# Patient Record
Sex: MALE | Race: WHITE | Employment: FULL TIME | URBAN - METROPOLITAN AREA
[De-identification: names, ages, dates, MRNs, and addresses within clinical notes are randomized per-mention and may not be internally consistent; named-entity substitution may affect disease eponyms.]

---

## 2022-10-06 ENCOUNTER — HOSPITAL ENCOUNTER (INPATIENT)
Age: 37
LOS: 4 days | Discharge: HOME | DRG: 751 | End: 2022-10-10
Attending: EMERGENCY MEDICINE | Admitting: INTERNAL MEDICINE
Payer: COMMERCIAL

## 2022-10-06 DIAGNOSIS — F32.A DEPRESSION WITH SUICIDAL IDEATION: Primary | ICD-10-CM

## 2022-10-06 DIAGNOSIS — R45.851 DEPRESSION WITH SUICIDAL IDEATION: Primary | ICD-10-CM

## 2022-10-06 LAB
ABSOLUTE EOS #: 0 K/UL (ref 0–0.4)
ABSOLUTE LYMPH #: 1.8 K/UL (ref 1–4.8)
ABSOLUTE MONO #: 0.5 K/UL (ref 0.1–1.3)
ACETAMINOPHEN LEVEL: <5 UG/ML (ref 10–30)
ALBUMIN SERPL-MCNC: 4.7 G/DL (ref 3.5–5.2)
ALP BLD-CCNC: 59 U/L (ref 40–129)
ALT SERPL-CCNC: 15 U/L (ref 5–41)
AMPHETAMINE SCREEN URINE: NEGATIVE
ANION GAP SERPL CALCULATED.3IONS-SCNC: 13 MMOL/L (ref 9–17)
AST SERPL-CCNC: 17 U/L
BARBITURATE SCREEN URINE: NEGATIVE
BASOPHILS # BLD: 0 % (ref 0–2)
BASOPHILS ABSOLUTE: 0 K/UL (ref 0–0.2)
BENZODIAZEPINE SCREEN, URINE: NEGATIVE
BILIRUB SERPL-MCNC: 0.7 MG/DL (ref 0.3–1.2)
BUN BLDV-MCNC: 9 MG/DL (ref 6–20)
CALCIUM SERPL-MCNC: 9.6 MG/DL (ref 8.6–10.4)
CANNABINOID SCREEN URINE: POSITIVE
CHLORIDE BLD-SCNC: 102 MMOL/L (ref 98–107)
CO2: 24 MMOL/L (ref 20–31)
COCAINE METABOLITE, URINE: NEGATIVE
CREAT SERPL-MCNC: 0.93 MG/DL (ref 0.7–1.2)
EOSINOPHILS RELATIVE PERCENT: 0 % (ref 0–4)
ETHANOL PERCENT: <0.01 %
ETHANOL: <10 MG/DL
FENTANYL URINE: NEGATIVE
GFR SERPL CREATININE-BSD FRML MDRD: >60 ML/MIN/1.73M2
GLUCOSE BLD-MCNC: 106 MG/DL (ref 70–99)
HCT VFR BLD CALC: 43.6 % (ref 41–53)
HEMOGLOBIN: 14.8 G/DL (ref 13.5–17.5)
LYMPHOCYTES # BLD: 17 % (ref 24–44)
MAGNESIUM: 2 MG/DL (ref 1.6–2.6)
MCH RBC QN AUTO: 28.8 PG (ref 26–34)
MCHC RBC AUTO-ENTMCNC: 33.8 G/DL (ref 31–37)
MCV RBC AUTO: 85 FL (ref 80–100)
METHADONE SCREEN, URINE: NEGATIVE
MONOCYTES # BLD: 5 % (ref 1–7)
OPIATES, URINE: NEGATIVE
OXYCODONE SCREEN URINE: NEGATIVE
PDW BLD-RTO: 13.2 % (ref 11.5–14.9)
PHENCYCLIDINE, URINE: NEGATIVE
PLATELET # BLD: 344 K/UL (ref 150–450)
PMV BLD AUTO: 7.7 FL (ref 6–12)
POTASSIUM SERPL-SCNC: 4.2 MMOL/L (ref 3.7–5.3)
RBC # BLD: 5.13 M/UL (ref 4.5–5.9)
SALICYLATE LEVEL: <1 MG/DL (ref 3–10)
SEG NEUTROPHILS: 78 % (ref 36–66)
SEGMENTED NEUTROPHILS ABSOLUTE COUNT: 8.3 K/UL (ref 1.3–9.1)
SODIUM BLD-SCNC: 139 MMOL/L (ref 135–144)
TEST INFORMATION: ABNORMAL
TOTAL PROTEIN: 7.7 G/DL (ref 6.4–8.3)
WBC # BLD: 10.5 K/UL (ref 3.5–11)

## 2022-10-06 PROCEDURE — 80143 DRUG ASSAY ACETAMINOPHEN: CPT

## 2022-10-06 PROCEDURE — 84443 ASSAY THYROID STIM HORMONE: CPT

## 2022-10-06 PROCEDURE — 83735 ASSAY OF MAGNESIUM: CPT

## 2022-10-06 PROCEDURE — 99285 EMERGENCY DEPT VISIT HI MDM: CPT

## 2022-10-06 PROCEDURE — 36415 COLL VENOUS BLD VENIPUNCTURE: CPT

## 2022-10-06 PROCEDURE — 80053 COMPREHEN METABOLIC PANEL: CPT

## 2022-10-06 PROCEDURE — 80307 DRUG TEST PRSMV CHEM ANLYZR: CPT

## 2022-10-06 PROCEDURE — 1240000000 HC EMOTIONAL WELLNESS R&B

## 2022-10-06 PROCEDURE — 6370000000 HC RX 637 (ALT 250 FOR IP): Performed by: PSYCHIATRY & NEUROLOGY

## 2022-10-06 PROCEDURE — G0480 DRUG TEST DEF 1-7 CLASSES: HCPCS

## 2022-10-06 PROCEDURE — 80179 DRUG ASSAY SALICYLATE: CPT

## 2022-10-06 PROCEDURE — 85025 COMPLETE CBC W/AUTO DIFF WBC: CPT

## 2022-10-06 RX ORDER — NAPROXEN SODIUM 220 MG
220 TABLET ORAL 2 TIMES DAILY PRN
COMMUNITY

## 2022-10-06 RX ORDER — HYDROXYZINE 50 MG/1
50 TABLET, FILM COATED ORAL 3 TIMES DAILY PRN
Status: DISCONTINUED | OUTPATIENT
Start: 2022-10-06 | End: 2022-10-10 | Stop reason: HOSPADM

## 2022-10-06 RX ORDER — HALOPERIDOL 5 MG
5 TABLET ORAL EVERY 6 HOURS PRN
Status: DISCONTINUED | OUTPATIENT
Start: 2022-10-06 | End: 2022-10-10 | Stop reason: HOSPADM

## 2022-10-06 RX ORDER — ACETAMINOPHEN 325 MG/1
650 TABLET ORAL EVERY 6 HOURS PRN
Status: DISCONTINUED | OUTPATIENT
Start: 2022-10-06 | End: 2022-10-08

## 2022-10-06 RX ORDER — HALOPERIDOL 5 MG/ML
5 INJECTION INTRAMUSCULAR EVERY 6 HOURS PRN
Status: DISCONTINUED | OUTPATIENT
Start: 2022-10-06 | End: 2022-10-10 | Stop reason: HOSPADM

## 2022-10-06 RX ORDER — TRAZODONE HYDROCHLORIDE 50 MG/1
50 TABLET ORAL NIGHTLY PRN
Status: DISCONTINUED | OUTPATIENT
Start: 2022-10-06 | End: 2022-10-10 | Stop reason: HOSPADM

## 2022-10-06 RX ORDER — IBUPROFEN 400 MG/1
400 TABLET ORAL EVERY 6 HOURS PRN
Status: DISCONTINUED | OUTPATIENT
Start: 2022-10-06 | End: 2022-10-10 | Stop reason: HOSPADM

## 2022-10-06 RX ORDER — DIPHENHYDRAMINE HYDROCHLORIDE 50 MG/ML
50 INJECTION INTRAMUSCULAR; INTRAVENOUS EVERY 6 HOURS PRN
Status: DISCONTINUED | OUTPATIENT
Start: 2022-10-06 | End: 2022-10-10 | Stop reason: HOSPADM

## 2022-10-06 RX ORDER — MAGNESIUM HYDROXIDE/ALUMINUM HYDROXICE/SIMETHICONE 120; 1200; 1200 MG/30ML; MG/30ML; MG/30ML
30 SUSPENSION ORAL EVERY 6 HOURS PRN
Status: DISCONTINUED | OUTPATIENT
Start: 2022-10-06 | End: 2022-10-10 | Stop reason: HOSPADM

## 2022-10-06 RX ORDER — POLYETHYLENE GLYCOL 3350 17 G/17G
17 POWDER, FOR SOLUTION ORAL DAILY PRN
Status: DISCONTINUED | OUTPATIENT
Start: 2022-10-06 | End: 2022-10-10 | Stop reason: HOSPADM

## 2022-10-06 RX ADMIN — HYDROXYZINE HYDROCHLORIDE 50 MG: 50 TABLET ORAL at 21:53

## 2022-10-06 RX ADMIN — TRAZODONE HYDROCHLORIDE 50 MG: 50 TABLET ORAL at 21:53

## 2022-10-06 ASSESSMENT — SLEEP AND FATIGUE QUESTIONNAIRES
SLEEP PATTERN: DIFFICULTY FALLING ASLEEP
AVERAGE NUMBER OF SLEEP HOURS: 6
DO YOU HAVE DIFFICULTY SLEEPING: YES
DO YOU USE A SLEEP AID: NO

## 2022-10-06 ASSESSMENT — PATIENT HEALTH QUESTIONNAIRE - PHQ9
SUM OF ALL RESPONSES TO PHQ QUESTIONS 1-9: 15
SUM OF ALL RESPONSES TO PHQ QUESTIONS 1-9: 17

## 2022-10-06 ASSESSMENT — ENCOUNTER SYMPTOMS
ABDOMINAL PAIN: 0
BACK PAIN: 0
EYE PAIN: 0
SHORTNESS OF BREATH: 0
COLOR CHANGE: 0

## 2022-10-06 ASSESSMENT — LIFESTYLE VARIABLES
HOW MANY STANDARD DRINKS CONTAINING ALCOHOL DO YOU HAVE ON A TYPICAL DAY: PATIENT DOES NOT DRINK
HOW OFTEN DO YOU HAVE A DRINK CONTAINING ALCOHOL: NEVER

## 2022-10-06 ASSESSMENT — PAIN - FUNCTIONAL ASSESSMENT: PAIN_FUNCTIONAL_ASSESSMENT: NONE - DENIES PAIN

## 2022-10-06 NOTE — ED NOTES
Patient pleasant in conversation, quietly resting in recliner. Blood draws obtained, awaiting urine specimen. Belongings and patient checked by security. Belongings locked up. Pt in blue gown.       Debby Ramey LPN  80/27/86 [id number]

## 2022-10-06 NOTE — ED PROVIDER NOTES
EMERGENCY DEPARTMENT ENCOUNTER    Pt Name: Redd Meade  MRN: [id number]  Birthdate 1985  Date of evaluation: 10/6/22  CHIEF COMPLAINT       Chief Complaint   Patient presents with    Suicidal     HISTORY OF PRESENT ILLNESS   78-year-old male presents for mental health evaluation. Patient reports that recently he has been feeling increasingly depressed secondary to family issues as well as relationship issues. He reports that last day or so he has been feeling suicidal.  He reports that he was having a plan to try and crash his car or shoot himself he reports that he does have guns in the house and access to guns. He denies any homicidal ideation, denies any visual or auditory hallucinations, denies any recent alcohol or drug use. Patient denies any medical complaints at this time. The history is provided by the patient. REVIEW OF SYSTEMS     Review of Systems   Constitutional:  Negative for chills and fever. HENT:  Negative for congestion and ear pain. Eyes:  Negative for pain. Respiratory:  Negative for shortness of breath. Cardiovascular:  Negative for chest pain, palpitations and leg swelling. Gastrointestinal:  Negative for abdominal pain. Genitourinary:  Negative for dysuria and flank pain. Musculoskeletal:  Negative for back pain. Skin:  Negative for color change. Neurological:  Negative for numbness and headaches. Psychiatric/Behavioral:  Positive for suicidal ideas. Negative for confusion. All other systems reviewed and are negative. PASTMEDICAL HISTORY     Past Medical History:   Diagnosis Date    Depression with suicidal ideation 10/6/2022     Past Problem List  Patient Active Problem List   Diagnosis Code    Depression with suicidal ideation F32. A, R45.851     SURGICAL HISTORY     No past surgical history on file.   CURRENT MEDICATIONS       Current Discharge Medication List        CONTINUE these medications which have NOT CHANGED    Details   naproxen sodium (ALEVE) 220 MG tablet Take 220 mg by mouth 2 times daily as needed for Pain           ALLERGIES     has No Known Allergies. FAMILY HISTORY     has no family status information on file. SOCIAL HISTORY       Social History     Tobacco Use    Smoking status: Every Day     Packs/day: 0.50     Types: Cigarettes   Substance Use Topics    Alcohol use: Yes     Alcohol/week: 40.0 standard drinks     Types: 20 Cans of beer, 20 Shots of liquor per week    Drug use: Yes     Types: Marijuana (Weed)     Comment: last use 3 days ago     PHYSICAL EXAM     INITIAL VITALS: /69   Pulse (!) 120   Temp 98.3 °F (36.8 °C) (Oral)   Resp 14   Ht 5' 10\" (1.778 m)   Wt 138 lb (62.6 kg)   SpO2 99%   BMI 19.80 kg/m²    Physical Exam  Vitals and nursing note reviewed. Constitutional:       General: He is not in acute distress. Appearance: Normal appearance. He is not ill-appearing. HENT:      Head: Normocephalic and atraumatic. Right Ear: External ear normal.      Left Ear: External ear normal.      Nose: Nose normal.      Mouth/Throat:      Mouth: Mucous membranes are moist.   Eyes:      Extraocular Movements: Extraocular movements intact. Pupils: Pupils are equal, round, and reactive to light. Cardiovascular:      Rate and Rhythm: Normal rate and regular rhythm. Pulses: Normal pulses. Heart sounds: Normal heart sounds. Pulmonary:      Effort: Pulmonary effort is normal.      Breath sounds: Normal breath sounds. Abdominal:      General: Abdomen is flat. Palpations: Abdomen is soft. Tenderness: There is no abdominal tenderness. Musculoskeletal:         General: No tenderness. Normal range of motion. Cervical back: Neck supple. No spinous process tenderness or muscular tenderness. Skin:     General: Skin is warm and dry. Capillary Refill: Capillary refill takes less than 2 seconds. Neurological:      General: No focal deficit present.       Mental Status: He is alert and oriented to person, place, and time. Cranial Nerves: Cranial nerves 2-12 are intact. Sensory: Sensation is intact. Motor: Motor function is intact. Psychiatric:         Mood and Affect: Mood is depressed. Affect is tearful. Behavior: Behavior normal.         Thought Content: Thought content includes suicidal ideation. Thought content does not include homicidal ideation. Thought content includes suicidal plan. MEDICAL DECISION MAKIN-year-old male presents for mental health evaluation. On initial exam patient in no acute distress vitals stable, patient voicing active suicidal thoughts with plan, will check labs and discuss with psychiatry    Labs reviewed and unremarkable, given patient with active suicidal thoughts and plan feel he will benefit from admission, results were discussed with patient, discussed plan for admission he is agreeable    Spoke with Dr. Dimas Esparza who accepts admission. Patient demonstrates understanding and agreement with the plan, was given the opportunity to ask questions, and these questions were answered to the best of the provided information at this time. VS stable for transfer. This dictation was prepared using [email or web address] voice recognition software. CRITICAL CARE:       PROCEDURES:    Procedures    DIAGNOSTIC RESULTS   EKG:All EKG's are interpreted by the Emergency Department Physician who either signs or Co-signs this chart in the absence of a cardiologist.        RADIOLOGY:All plain film, CT, MRI, and formal ultrasound images (except ED bedside ultrasound) are read by the radiologist, see reports below, unless otherwisenoted in MDM or here. No orders to display     LABS: All lab results were reviewed by myself, and all abnormals are listed below.   Labs Reviewed   CBC WITH AUTO DIFFERENTIAL - Abnormal; Notable for the following components:       Result Value    Seg Neutrophils 78 (*)     Lymphocytes 17 (*)     All other components within normal limits   COMPREHENSIVE METABOLIC PANEL - Abnormal; Notable for the following components:    Glucose 106 (*)     All other components within normal limits   SALICYLATE LEVEL - Abnormal; Notable for the following components:    Salicylate Lvl <1 (*)     All other components within normal limits   ACETAMINOPHEN LEVEL - Abnormal; Notable for the following components:    Acetaminophen Level <5 (*)     All other components within normal limits   URINE DRUG SCREEN - Abnormal; Notable for the following components:    Cannabinoid Scrn, Ur POSITIVE (*)     All other components within normal limits   ETHANOL   MAGNESIUM       EMERGENCY DEPARTMENTCOURSE:         Vitals:    Vitals:    10/06/22 1442 10/06/22 1806 10/06/22 2002   BP: (!) 142/65 124/78 113/69   Pulse: 77 (!) 112 (!) 120   Resp: 18 20 14   Temp: 97.8 °F (36.6 °C) 98.1 °F (36.7 °C) 98.3 °F (36.8 °C)   TempSrc: Oral Oral Oral   SpO2: 99% 99%    Weight: 135 lb (61.2 kg) 138 lb (62.6 kg)    Height: 5' 10\" (1.778 m) 5' 10\" (1.778 m)        The patient was given the following medications while in the emergency department:  Orders Placed This Encounter   Medications    acetaminophen (TYLENOL) tablet 650 mg    ibuprofen (ADVIL;MOTRIN) tablet 400 mg    hydrOXYzine HCl (ATARAX) tablet 50 mg    traZODone (DESYREL) tablet 50 mg    polyethylene glycol (GLYCOLAX) packet 17 g    aluminum & magnesium hydroxide-simethicone (MAALOX) 200-200-20 MG/5ML suspension 30 mL    nicotine polacrilex (NICORETTE) gum 2 mg    AND Linked Order Group     haloperidol lactate (HALDOL) injection 5 mg     diphenhydrAMINE (BENADRYL) injection 50 mg    haloperidol (HALDOL) tablet 5 mg     CONSULTS:  IP CONSULT TO INTERNAL MEDICINE    FINAL IMPRESSION      1. Depression with suicidal ideation          DISPOSITION/PLAN   DISPOSITION Admitted 10/06/2022 04:38:34 PM      PATIENT REFERRED TO:  No follow-up provider specified.   DISCHARGE MEDICATIONS:  Current Discharge Medication List The care is provided during an unprecedented national emergency due to the novel coronavirus, COVID 19.   [address], DO                     [address], DO  10/07/22 9092

## 2022-10-06 NOTE — BH NOTE
Patient given tobacco quitline number [phone] at this time, refusing to call at this time, states \" I just dont want to quit now\"- patient given information as to the dangers of long term tobacco use. Continue to reinforce the importance of tobacco cessation.

## 2022-10-06 NOTE — BH NOTE
[facility]  Admission Note     Admission Type:   Admission Type: Voluntary    Reason for admission:  Reason for Admission: Suicidal ideations due to family complications - Wife wanting to seperate      Addictive Behavior:   Addictive Behavior  In the Past 3 Months, Have You Felt or Has Someone Told You That You Have a Problem With  : None    Medical Problems:   Past Medical History:   Diagnosis Date    Depression with suicidal ideation 10/6/2022       Status EXAM:  Mental Status and Behavioral Exam  Normal: No  Level of Assistance: Independent/Self  Facial Expression: Sad, Worried  Affect: Appropriate  Level of Consciousness: Alert  Frequency of Checks: 4 times per hour, close  Mood:Normal: No  Mood: Depressed, Anxious, Sad, Guilty, Helpless  Motor Activity:Normal: No  Motor Activity: Decreased  Eye Contact: Good  Observed Behavior: Cooperative, Friendly, Tearful  Sexual Misconduct History: Current - no  Preception: [place] to person, [place] to time, [place] to place, [place] to situation  Attention:Normal: Yes  Thought Processes: Other (comment) (Linear)  Thought Content:Normal: No  Thought Content: Preoccupations  Depression Symptoms: Feelings of hopelessess, Feelings of worthlessness, Feelings of helplessness, Crying, Change in energy level  Anxiety Symptoms: Generalized  Katt Symptoms: No problems reported or observed.   Hallucinations: None  Delusions: No  Memory:Normal: Yes  Insight and Judgment: No  Insight and Judgment: Poor judgment, Poor insight    Tobacco Screening:  Practical Counseling, on admission, nikky X, if applicable and completed (first 3 are required if patient doesn't refuse):            ( ) Recognizing danger situations (included triggers and roadblocks)                    ( ) Coping skills (new ways to manage stress,relaxation techniques, changing routine, distraction)                                                           ( ) Basic information about quitting (benefits of

## 2022-10-06 NOTE — ED NOTES
Safeguard in [facility] for patient watch. Safeguard informed that they need to stay with the patient at all time, must be present in the room and if they need a break or relief to let the nurse know so they can be replaced. Safeguard verbalizes understanding. Belongings and patient checked by security. Belongings locked up. Pt in blue gown.       Rebekah Nguyen RN  10/06/22 1063

## 2022-10-06 NOTE — PROGRESS NOTES
Medication History completed:    New medications: naproxen    Medications discontinued: ibuprofen    Changes to dosing: none    Stated allergies: NKDA    Other pertinent information: Medications confirmed with patient. He denies use of prescription medications. The patient reports use of cannabis yesterday.     Thank you,  Adrián Holman, PharmD, BCPS  [phone]

## 2022-10-06 NOTE — BH NOTE
Patient has arrived on the unit. Patient was scanned with metal detector and changed into a gown before oriented to unit.

## 2022-10-06 NOTE — ED TRIAGE NOTES
Mode of arrival (squad #, walk in, police, etc) : walk-in        Chief complaint(s): SI        Arrival Note (brief scenario, treatment PTA, etc). : Pt reports SI all day today. Pt reports he is afraid to be at home alone because he might shoot himself. C= \"Have you ever felt that you should Cut down on your drinking? \"  No  A= \"Have people Annoyed you by criticizing your drinking? \"  No  G= \"Have you ever felt bad or Guilty about your drinking? \"  No  E= \"Have you ever had a drink as an Eye-opener first thing in the morning to steady your nerves or to help a hangover? \"  No      Deferred []      Reason for deferring: N/A    *If yes to two or more: probable alcohol abuse. *

## 2022-10-07 PROBLEM — F33.2 MAJOR DEPRESSIVE DISORDER, RECURRENT SEVERE WITHOUT PSYCHOTIC FEATURES (HCC): Status: ACTIVE | Noted: 2022-10-07

## 2022-10-07 PROBLEM — F12.90 CANNABIS USE DISORDER: Status: ACTIVE | Noted: 2022-10-07

## 2022-10-07 LAB — TSH SERPL DL<=0.05 MIU/L-ACNC: 0.91 UIU/ML (ref 0.3–5)

## 2022-10-07 PROCEDURE — APPSS60 APP SPLIT SHARED TIME 46-60 MINUTES: Performed by: NURSE PRACTITIONER

## 2022-10-07 PROCEDURE — 99223 1ST HOSP IP/OBS HIGH 75: CPT | Performed by: INTERNAL MEDICINE

## 2022-10-07 PROCEDURE — 6370000000 HC RX 637 (ALT 250 FOR IP): Performed by: PSYCHIATRY & NEUROLOGY

## 2022-10-07 PROCEDURE — 1240000000 HC EMOTIONAL WELLNESS R&B

## 2022-10-07 PROCEDURE — 90792 PSYCH DIAG EVAL W/MED SRVCS: CPT | Performed by: PSYCHIATRY & NEUROLOGY

## 2022-10-07 RX ORDER — SERTRALINE HYDROCHLORIDE 25 MG/1
25 TABLET, FILM COATED ORAL DAILY
Status: DISCONTINUED | OUTPATIENT
Start: 2022-10-07 | End: 2022-10-08

## 2022-10-07 RX ADMIN — NICOTINE POLACRILEX 2 MG: 2 GUM, CHEWING BUCCAL at 15:00

## 2022-10-07 RX ADMIN — NICOTINE POLACRILEX 2 MG: 2 GUM, CHEWING BUCCAL at 09:11

## 2022-10-07 RX ADMIN — TRAZODONE HYDROCHLORIDE 50 MG: 50 TABLET ORAL at 21:47

## 2022-10-07 RX ADMIN — SERTRALINE HYDROCHLORIDE 25 MG: 25 TABLET ORAL at 16:05

## 2022-10-07 RX ADMIN — NICOTINE POLACRILEX 2 MG: 2 GUM, CHEWING BUCCAL at 18:47

## 2022-10-07 RX ADMIN — HYDROXYZINE HYDROCHLORIDE 50 MG: 50 TABLET ORAL at 21:47

## 2022-10-07 ASSESSMENT — PAIN SCALES - GENERAL: PAINLEVEL_OUTOF10: 0
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
## 2022-10-07 NOTE — GROUP NOTE
Group Therapy Note    Date: 10/7/2022    Group Start Time: 1300  Group End Time: 1430  Group Topic: Cognitive Skills    FITO Zavala        Group Therapy Note    Attendees: 6/19     Patient's Goal:  Be able to think critically while working in teams with peers. Notes:  Played board games in teams    Status After Intervention:  Improved    Participation Level:  Active Listener and Interactive    Participation Quality: Appropriate, Attentive, Sharing, and Supportive      Speech:  normal      Thought Process/Content: Logical  Linear      Affective Functioning: Congruent      Mood: euthymic      Level of consciousness:  Alert, Oriented x4, and Attentive      Response to Learning: Capable of insight      Endings: None Reported    Modes of Intervention: Support, Socialization, and Problem-solving      Discipline Responsible: Behavorial Health Tech      Signature:  Sadaf Zavala

## 2022-10-07 NOTE — GROUP NOTE
Group Therapy Note    Date: 10/7/2022    Group Start Time: 0900  Group End Time: 0930  Group Topic: Community Meeting    [facility] Group Note        Date: October 7, 2022 Start Time: 0900 End Time: 0930      Number of Participants in Group & Unit Census:  13/19    Topic: Goals Group    Goal of Group: State 1 or 2 short term goals the patient would like to accomplish by the end of the day today. Comments:    Patient did not participate in [employer] group, despite staff encouragement and explanation of benefits. Patient remain seclusive to self. Q15 minute safety checks maintained for patient safety and will continue to encourage patient to attend unit programming.        Group Therapy Note    Attendees: 13/19     Signature:  John Alex

## 2022-10-07 NOTE — PLAN OF CARE
[facility]  Initial Interdisciplinary Treatment Plan NO      Original treatment plan Date & Time: 10/7/2022 1300    Admission Type:  Admission Type: Voluntary    Reason for admission:   Reason for Admission: Suicidal ideations due to family complications - Wife wanting to seperate    Estimated Length of Stay:  5-7days  Estimated Discharge Date: to be determined by physician    PATIENT STRENGTHS:  Patient Strengths:   Patient Strengths and Limitations:   Addictive Behavior: Addictive Behavior  In the Past 3 Months, Have You Felt or Has Someone Told You That You Have a Problem With  : None  Medical Problems:  Past Medical History:   Diagnosis Date    Depression with suicidal ideation 10/6/2022     Status EXAM:Mental Status and Behavioral Exam  Normal: No  Level of Assistance: Independent/Self  Facial Expression: Sad, Worried  Affect: Appropriate  Level of Consciousness: Alert  Frequency of Checks: 4 times per hour, close  Mood:Normal: No  Mood: Depressed, Anxious, Sad, Helpless  Motor Activity:Normal: No  Motor Activity: Decreased  Eye Contact: Good  Observed Behavior: Cooperative, Preoccupied  Sexual Misconduct History: Current - no  Preception: [place] to person, [place] to time, [place] to place, [place] to situation  Attention:Normal: Yes  Thought Processes: Other (comment) (preoccupied concerning issues)  Thought Content:Normal: No  Thought Content: Preoccupations  Depression Symptoms: Feelings of helplessness, Feelings of hopelessess, Feelings of worthlessness  Anxiety Symptoms: Generalized  Katt Symptoms: No problems reported or observed.   Hallucinations: None  Delusions: No  Memory:Normal: Yes  Insight and Judgment: Yes  Insight and Judgment: Poor judgment, Poor insight    EDUCATION:   Learner Progress Toward Treatment Goals: reviewed group plans and strategies for care    Method:group therapy, medication compliance, individualized assessments and care planning    Outcome: needs reinforcement    PATIENT GOALS: Short term: Start medications and plan discharge for outpatient care. Long term: Medication compliance, going to therapy, and continuing treatment when patient leaves facility.      PLAN/TREATMENT RECOMMENDATIONS:     continue group therapy , medications compliance, goal setting, individualized assessments and care, continue to monitor pt on unit      SHORT-TERM GOALS:   Time frame for Short-Term Goals: 5-7 days    LONG-TERM GOALS:  Time frame for Long-Term Goals: 6 months  Members Present in Team Meeting: See Signature Sheet    Boyd Mendez RN

## 2022-10-07 NOTE — H&P
[facility] Internal Medicine  Estela Perry MD; Michael Ann MD; Rosalio Gibbons MD; MD Shannan Montero MD; MD KEITH [facility] Internal Medicine   [facility]    HISTORY AND PHYSICAL EXAMINATION            Date:   10/7/2022  Patient name:  Tunde Clark  Date of admission:  10/6/2022  2:46 PM  MRN:   [id number]  Account:  [de-identified]  YOB: 1985  PCP:    No primary care provider on file. Room:   [address] [place] [address]  Code Status:    Full Code    Chief Complaint:     Chief Complaint   Patient presents with    Suicidal       History Obtained From:     Patient medical record nursing staff    History of Present Illness:     Tunde Clark is a 40 y.o. Non- / non  male who presents with Suicidal   and is admitted to the hospital for the management of Depression with suicidal ideation. 60-year-old gentleman with a mild mild protein calorie malnutrition no history of hyper or hypothyroidism admitted with a depression suicidal ideation denies any nausea vomiting diarrhea  No aggravating relieving factor      Past Medical History:     Past Medical History:   Diagnosis Date    Depression with suicidal ideation 10/6/2022        Past Surgical History:     No past surgical history on file. Medications Prior to Admission:     Prior to Admission medications    Medication Sig Start Date End Date Taking? Authorizing Provider   naproxen sodium (ALEVE) 220 MG tablet Take 220 mg by mouth 2 times daily as needed for Pain   Yes Historical Provider, MD        Allergies:     Patient has no known allergies. Social History:     Tobacco:    reports that he has been smoking cigarettes. He has been smoking an average of .5 packs per day. He does not have any smokeless tobacco history on file. Alcohol:      reports current alcohol use of about 40.0 standard drinks per week. Drug Use:  reports current drug use. Drug: Marijuana Aloma Maiyet). Family History:     No family history on file. Review of Systems:     Positive and Negative as described in HPI. CONSTITUTIONAL:  negative for fevers, chills, sweats, fatigue, weight loss  HEENT:  negative for vision, hearing changes, runny nose, throat pain  RESPIRATORY:  negative for shortness of breath, cough, congestion, wheezing  CARDIOVASCULAR:  negative for chest pain, palpitations  GASTROINTESTINAL:  negative for nausea, vomiting, diarrhea, constipation, change in bowel habits, abdominal pain   GENITOURINARY:  negative for difficulty of urination, burning with urination, frequency   INTEGUMENT:  negative for rash, skin lesions, easy bruising   HEMATOLOGIC/LYMPHATIC:  negative for swelling/edema   ALLERGIC/IMMUNOLOGIC:  negative for urticaria , itching  ENDOCRINE:  negative increase in drinking, increase in urination, hot or cold intolerance  MUSCULOSKELETAL:  negative joint pains, muscle aches, swelling of joints  NEUROLOGICAL:  negative for headaches, dizziness, lightheadedness, numbness, pain, tingling extremities      Physical Exam:   BP (!) 103/58   Pulse (!) 42   Temp 98.3 °F (36.8 °C)   Resp 14   Ht 5' 10\" (1.778 m)   Wt 138 lb (62.6 kg)   SpO2 99%   BMI 19.80 kg/m²   Temp (24hrs), Av.1 °F (36.7 °C), Min:97.8 °F (36.6 °C), Max:98.3 °F (36.8 °C)    No results for input(s): POCGLU in the last 72 hours.   No intake or output data in the 24 hours ending 10/07/22 1047  Mild protein calorie malnutrition  General Appearance: alert, well appearing, and in no acute distress  Mental status: oriented to person, place, and time  Head: normocephalic, atraumatic  Eye: no icterus, redness, pupils equal and reactive, extraocular eye movements intact, conjunctiva clear  Ear: normal external ear, no discharge, hearing intact  Nose: no drainage noted  Mouth: mucous membranes moist  Neck: supple, no carotid bruits, thyroid not palpable  Lungs: Bilateral equal air entry, clear to ausculation, no wheezing, rales or rhonchi, normal effort  Cardiovascular: normal rate, regular rhythm, no murmur, gallop, rub  Abdomen: Soft, nontender, nondistended, normal bowel sounds, no hepatomegaly or splenomegaly  Neurologic: There are no new focal motor or sensory deficits, normal muscle tone and bulk, no abnormal sensation, normal speech, cranial nerves II through XII grossly intact  Skin: No gross lesions, rashes, bruising or bleeding on exposed skin area  Extremities: peripheral pulses palpable, no pedal edema or calf pain with palpation  Multiple tattoos noted    Investigations:      Laboratory Testing:  Recent Results (from the past 24 hour(s))   CBC with Auto Differential    Collection Time: 10/06/22  3:42 PM   Result Value Ref Range    WBC 10.5 3.5 - 11.0 k/uL    RBC 5.13 4.5 - 5.9 m/uL    Hemoglobin 14.8 13.5 - 17.5 g/dL    Hematocrit 43.6 41 - 53 %    MCV 85.0 80 - 100 fL    MCH 28.8 26 - 34 pg    MCHC 33.8 31 - 37 g/dL    RDW 13.2 11.5 - 14.9 %    Platelets 890 595 - 409 k/uL    MPV 7.7 6.0 - 12.0 fL    Seg Neutrophils 78 (H) 36 - 66 %    Lymphocytes 17 (L) 24 - 44 %    Monocytes 5 1 - 7 %    Eosinophils % 0 0 - 4 %    Basophils 0 0 - 2 %    Segs Absolute 8.30 1.3 - 9.1 k/uL    Absolute Lymph # 1.80 1.0 - 4.8 k/uL    Absolute Mono # 0.50 0.1 - 1.3 k/uL    Absolute Eos # 0.00 0.0 - 0.4 k/uL    Basophils Absolute 0.00 0.0 - 0.2 k/uL   CMP    Collection Time: 10/06/22  3:42 PM   Result Value Ref Range    Glucose 106 (H) 70 - 99 mg/dL    BUN 9 6 - 20 mg/dL    Creatinine 0.93 0.70 - 1.20 mg/dL    Est, Glom Filt Rate >60 >60 mL/min/1.73m2    Calcium 9.6 8.6 - 10.4 mg/dL    Sodium 139 135 - 144 mmol/L    Potassium 4.2 3.7 - 5.3 mmol/L    Chloride 102 98 - 107 mmol/L    CO2 24 20 - 31 mmol/L    Anion Gap 13 9 - 17 mmol/L    Alkaline Phosphatase 59 40 - 129 U/L    ALT 15 5 - 41 U/L    AST 17 <40 U/L    Total Bilirubin 0.7 0.3 - 1.2 mg/dL    Total Protein 7.7 6.4 - 8.3 g/dL    Albumin 4.7 3.5 - 5.2 g/dL Salicylate    Collection Time: 10/06/22  3:42 PM   Result Value Ref Range    Salicylate Lvl <1 (L) 3 - 10 mg/dL   Acetaminophen Level    Collection Time: 10/06/22  3:42 PM   Result Value Ref Range    Acetaminophen Level <5 (L) 10 - 30 ug/mL   ETOH    Collection Time: 10/06/22  3:42 PM   Result Value Ref Range    Ethanol <10 <10 mg/dL    Ethanol percent <0.010 %   Magnesium    Collection Time: 10/06/22  3:42 PM   Result Value Ref Range    Magnesium 2.0 1.6 - 2.6 mg/dL   Urine Drug Screen    Collection Time: 10/06/22  4:23 PM   Result Value Ref Range    Amphetamine Screen, Ur NEGATIVE NEGATIVE    Barbiturate Screen, Ur NEGATIVE NEGATIVE    Benzodiazepine Screen, Urine NEGATIVE     Cocaine Metabolite, Urine NEGATIVE NEGATIVE    Methadone Screen, Urine NEGATIVE NEGATIVE    Opiates, Urine NEGATIVE NEGATIVE    Phencyclidine, Urine NEGATIVE NEGATIVE    Cannabinoid Scrn, Ur POSITIVE (A) NEGATIVE    Oxycodone Screen, Ur NEGATIVE NEGATIVE    Fentanyl, Ur NEGATIVE NEGATIVE    Test Information       Assay provides medical screening only. The absence of expected drug(s) and/or metabolite(s) may indicate diluted or adulterated urine, limitations of testing or timing of collection. Imaging/Diagnostics:  No results found. Assessment :      Hospital Problems             Last Modified POA    * (Principal) Depression with suicidal ideation 10/6/2022 Yes       Plan:     81-year-old gentleman who has mental health disorder admitted for depression suicidal  No history of hyper or hypothyroidism  No significant weight loss recently  Mild protein calorie malnutrition  Street drug abuse positive for cannabis  Exams vitals labs reviewed we will follow  Check TSH with reflex        Eleazar House MD  10/7/2022  10:47 AM    Copy sent to Dr. Nicky Foster primary care provider on file. Please note that this chart was generated using voice recognition Dragon dictation software.   Although every effort was made to ensure the accuracy of this automated transcription, some errors in transcription may have occurred.

## 2022-10-07 NOTE — PLAN OF CARE
Problem: Self Harm/Suicidality  Goal: Will have no self-injury during hospital stay  Description: INTERVENTIONS:  1. Q 30 MINUTES: Routine safety checks  2. Q SHIFT & PRN: Assess risk to determine if routine checks are adequate to maintain patient safety  Outcome: Progressing  Note: Patient denies any thoughts to suicidal ideations and no signs of self harm were noted. Patient encouraged to inform staff if he starts to develop any thoughts to harm self. Patient voiced understanding. Problem: Depression  Goal: Will be euthymic at discharge  Description: INTERVENTIONS:  1. Administer medication as ordered  2. Provide emotional support via 1:1 interaction with staff  3. Encourage involvement in milieu/groups/activities  4. Monitor for social isolation  Outcome: Progressing  Note: Patient reports severe depression and anxiety related to his current life circumstances. Patient is tearful and crying during 1:1 interaction. Writer and pt discussed coping skills pt could use for depression and anxiety such as writing in a journal, goal writing, doing puzzles and talking to others. Pt denies any suicidal ideations at this time. Pt encouraged to inform staff if suicidal ideations return. Pt was accepting of education. Problem: Anxiety  Goal: Will report anxiety at manageable levels  Description: INTERVENTIONS:  1. Administer medication as ordered  2. Teach and rehearse alternative coping skills  3. Provide emotional support with 1:1 interaction with staff  Outcome: Abhijit Dubois (Taken 10/6/2022 2014)  Will report anxiety at manageable levels:   Administer medication as ordered   Provide emotional support with 1:1 interaction with staff   Teach and rehearse alternative coping skills  Note: Patient reports severe anxiety related to life circumstances. Pt reports he would like something for anxiety at bedtime to help him calm down enough to sleep.  Pt encouraged to inform staff if he needs any further interventions for anxiety. Pt voiced understanding.

## 2022-10-07 NOTE — PROGRESS NOTES
Behavioral Services  Medicare Certification Upon Admission    I certify that this patient's inpatient psychiatric hospital admission is medically necessary for:    [x] (1) Treatment which could reasonably be expected to improve this patient's condition,       [x] (2) Or for diagnostic study;     AND     [x](2) The inpatient psychiatric services are provided while the individual is under the care of a physician and are included in the individualized plan of care.     Estimated length of stay/service 4 to 7 days    Plan for post-hospital care Home with outpatient community mental health follow-up    Electronically signed by Ji Martin MD on 10/7/2022 at 3:30 PM

## 2022-10-07 NOTE — PROGRESS NOTES
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
## 2022-10-08 PROCEDURE — 6370000000 HC RX 637 (ALT 250 FOR IP): Performed by: PSYCHIATRY & NEUROLOGY

## 2022-10-08 PROCEDURE — 99232 SBSQ HOSP IP/OBS MODERATE 35: CPT | Performed by: INTERNAL MEDICINE

## 2022-10-08 PROCEDURE — 99232 SBSQ HOSP IP/OBS MODERATE 35: CPT | Performed by: NURSE PRACTITIONER

## 2022-10-08 PROCEDURE — 1240000000 HC EMOTIONAL WELLNESS R&B

## 2022-10-08 RX ORDER — ACETAMINOPHEN, ASPIRIN AND CAFFEINE 250; 250; 65 MG/1; MG/1; MG/1
1 TABLET, FILM COATED ORAL EVERY 6 HOURS PRN
Status: DISCONTINUED | OUTPATIENT
Start: 2022-10-08 | End: 2022-10-10 | Stop reason: HOSPADM

## 2022-10-08 RX ADMIN — NICOTINE POLACRILEX 2 MG: 2 GUM, CHEWING BUCCAL at 15:45

## 2022-10-08 RX ADMIN — NICOTINE POLACRILEX 2 MG: 2 GUM, CHEWING BUCCAL at 11:20

## 2022-10-08 RX ADMIN — TRAZODONE HYDROCHLORIDE 50 MG: 50 TABLET ORAL at 21:48

## 2022-10-08 RX ADMIN — SERTRALINE HYDROCHLORIDE 25 MG: 25 TABLET ORAL at 08:10

## 2022-10-08 RX ADMIN — ACETAMINOPHEN 650 MG: 325 TABLET, FILM COATED ORAL at 08:10

## 2022-10-08 RX ADMIN — HYDROXYZINE HYDROCHLORIDE 50 MG: 50 TABLET ORAL at 21:48

## 2022-10-08 RX ADMIN — IBUPROFEN 400 MG: 400 TABLET ORAL at 11:20

## 2022-10-08 RX ADMIN — NICOTINE POLACRILEX 2 MG: 2 GUM, CHEWING BUCCAL at 21:48

## 2022-10-08 ASSESSMENT — PAIN SCALES - GENERAL
PAINLEVEL_OUTOF10: 4
PAINLEVEL_OUTOF10: 4
PAINLEVEL_OUTOF10: 3
PAINLEVEL_OUTOF10: 6

## 2022-10-08 ASSESSMENT — PAIN DESCRIPTION - LOCATION
LOCATION: HEAD

## 2022-10-08 ASSESSMENT — PAIN DESCRIPTION - DESCRIPTORS: DESCRIPTORS: ACHING

## 2022-10-08 NOTE — PLAN OF CARE
Problem: Self Harm/Suicidality  Goal: Will have no self-injury during hospital stay  Description: INTERVENTIONS:  1. Q 30 MINUTES: Routine safety checks  2. Q SHIFT & PRN: Assess risk to determine if routine checks are adequate to maintain patient safety  Outcome: Progressing  Note: There have been no incidents of self harm observed or reported thus far, this shift. Problem: Depression  Goal: Will be euthymic at discharge  Description: INTERVENTIONS:  1. Administer medication as ordered  2. Provide emotional support via 1:1 interaction with staff  3. Encourage involvement in milieu/groups/activities  4. Monitor for social isolation  Outcome: Progressing  Note: Patient reports depression is improved. He is social with peers and out in day area for most of shift. Patient denies thoughts of harm to self or others. Visual safety checks are completed every 15 minutes and randomly. Patient reports that he is sleeping better and eating well. Problem: Anxiety  Goal: Will report anxiety at manageable levels  Description: INTERVENTIONS:  1. Administer medication as ordered  2. Teach and rehearse alternative coping skills  3.  Provide emotional support with 1:1 interaction with staff  Outcome: Progressing  Flowsheets  Taken 10/8/2022 1330  Will report anxiety at manageable levels:   Administer medication as ordered   Teach and rehearse alternative coping skills   Provide emotional support with 1:1 interaction with staff  Taken 10/8/2022 1325  Will report anxiety at manageable levels:   Administer medication as ordered   Teach and rehearse alternative coping skills   Provide emotional support with 1:1 interaction with staff     Problem: Pain  Goal: Verbalizes/displays adequate comfort level or baseline comfort level  Outcome: Progressing  Flowsheets (Taken 10/8/2022 1500)  Verbalizes/displays adequate comfort level or baseline comfort level:   Encourage patient to monitor pain and request assistance   Assess pain using appropriate pain scale   Administer analgesics based on type and severity of pain and evaluate response

## 2022-10-08 NOTE — PROGRESS NOTES
[facility]  Benjie Martinez MD; No Milan MD; Álvaro Summers MD; Clarke Capers, MD Brendolyn Rinne, MD; MD KEITH Aguero [facility]   [facility]    HISTORY AND PHYSICAL EXAMINATION            Date:   10/8/2022  Patient name:  Pamela Bowers  Date of admission:  10/6/2022  2:46 PM  MRN:   [id number]  Account:  [de-identified]  YOB: 1985  PCP:    No primary care provider on file. Room:   [address] [place] [address]  Code Status:    Full Code    Chief Complaint:     Chief Complaint   Patient presents with    Suicidal       History Obtained From:     Patient medical record nursing staff    History of Present Illness:     Pamela Bowers is a 40 y.o. Non- / non  male who presents with Suicidal   and is admitted to the hospital for the management of Major depressive disorder, recurrent severe without psychotic features ([facility] [address] 72-year-old gentleman with a mild mild protein calorie malnutrition no history of hyper or hypothyroidism admitted with a depression suicidal ideation denies any nausea vomiting diarrhea  No aggravating relieving factor      Past Medical History:     Past Medical History:   Diagnosis Date    Depression with suicidal ideation 10/6/2022        Past Surgical History:     No past surgical history on file. Medications Prior to Admission:     Prior to Admission medications    Medication Sig Start Date End Date Taking? Authorizing Provider   naproxen sodium (ALEVE) 220 MG tablet Take 220 mg by mouth 2 times daily as needed for Pain   Yes Historical Provider, MD        Allergies:     Patient has no known allergies. Social History:     Tobacco:    reports that he has been smoking cigarettes. He has been smoking an average of .5 packs per day. He does not have any smokeless tobacco history on file.   Alcohol:      reports current alcohol use of about 40.0 standard drinks per week.  Drug Use:  reports current drug use. Drug: Marijuana Sable Kavontammyjustina). Family History:     No family history on file. Review of Systems:     Positive and Negative as described in HPI. CONSTITUTIONAL:  negative for fevers, chills, sweats, fatigue, weight loss  HEENT:  negative for vision, hearing changes, runny nose, throat pain  RESPIRATORY:  negative for shortness of breath, cough, congestion, wheezing  CARDIOVASCULAR:  negative for chest pain, palpitations  GASTROINTESTINAL:  negative for nausea, vomiting, diarrhea, constipation, change in bowel habits, abdominal pain   GENITOURINARY:  negative for difficulty of urination, burning with urination, frequency   INTEGUMENT:  negative for rash, skin lesions, easy bruising   HEMATOLOGIC/LYMPHATIC:  negative for swelling/edema   ALLERGIC/IMMUNOLOGIC:  negative for urticaria , itching  ENDOCRINE:  negative increase in drinking, increase in urination, hot or cold intolerance  MUSCULOSKELETAL:  negative joint pains, muscle aches, swelling of joints  NEUROLOGICAL:  negative for headaches, dizziness, lightheadedness, numbness, pain, tingling extremities      Physical Exam:   BP (!) 101/46   Pulse 51   Temp 97.9 °F (36.6 °C) (Oral)   Resp 16   Ht 5' 10\" (1.778 m)   Wt 138 lb (62.6 kg)   SpO2 99%   BMI 19.80 kg/m²   Temp (24hrs), Av.1 °F (36.7 °C), Min:97.9 °F (36.6 °C), Max:98.3 °F (36.8 °C)    No results for input(s): POCGLU in the last 72 hours.   No intake or output data in the 24 hours ending 10/08/22 1359  Mild protein calorie malnutrition  General Appearance: alert, well appearing, and in no acute distress  Mental status: oriented to person, place, and time  Head: normocephalic, atraumatic  Eye: no icterus, redness, pupils equal and reactive, extraocular eye movements intact, conjunctiva clear  Ear: normal external ear, no discharge, hearing intact  Nose: no drainage noted  Mouth: mucous membranes moist  Neck: supple, no carotid bruits, thyroid not palpable  Lungs: Bilateral equal air entry, clear to ausculation, no wheezing, rales or rhonchi, normal effort  Cardiovascular: normal rate, regular rhythm, no murmur, gallop, rub  Abdomen: Soft, nontender, nondistended, normal bowel sounds, no hepatomegaly or splenomegaly  Neurologic: There are no new focal motor or sensory deficits, normal muscle tone and bulk, no abnormal sensation, normal speech, cranial nerves II through XII grossly intact  Skin: No gross lesions, rashes, bruising or bleeding on exposed skin area  Extremities: peripheral pulses palpable, no pedal edema or calf pain with palpation  Multiple tattoos noted    Investigations:      Laboratory Testing:  No results found for this or any previous visit (from the past 24 hour(s)). Imaging/Diagnostics:  No results found. Assessment :      Hospital Problems             Last Modified POA    * (Principal) Major depressive disorder, recurrent severe without psychotic features ([facility] [address] 10/7/2022 Yes    Cannabis use disorder 10/7/2022 Yes     Plan:     44-year-old gentleman who has mental health disorder admitted for depression suicidal  No history of hyper or hypothyroidism  No significant weight loss recently  Mild protein calorie malnutrition  Street drug abuse positive for cannabis  Exams vitals labs reviewed we will follow  = TSH with reflex 0.9   Will sign off        Michelle York MD  10/8/2022  1:59 PM    Copy sent to Dr. Bambi Daniels primary care provider on file. Please note that this chart was generated using voice recognition Dragon dictation software. Although every effort was made to ensure the accuracy of this automated transcription, some errors in transcription may have occurred.

## 2022-10-08 NOTE — PLAN OF CARE
Problem: Self Harm/Suicidality  Goal: Will have no self-injury during hospital stay  Description: INTERVENTIONS:  1. Q 30 MINUTES: Routine safety checks  2. Q SHIFT & PRN: Assess risk to determine if routine checks are adequate to maintain patient safety  10/7/2022 2056 by Maggi Sun RN  Outcome: Progressing  Note: Patient has had no physical injuries and no signs of self harm were noted. Patient encouraged to inform staff if he starts to develop any thoughts to harm self. Patient voiced understanding. Problem: Depression  Goal: Will be euthymic at discharge  Description: INTERVENTIONS:  1. Administer medication as ordered  2. Provide emotional support via 1:1 interaction with staff  3. Encourage involvement in milieu/groups/activities  4. Monitor for social isolation  10/7/2022 2056 by Maggi Sun RN  Outcome: Progressing  Note: Patient reports his depression and anxiety are \"a little better. \" Pt reports feeling less hopeless and helpless, also. Pt encouraged to attend groups to learn coping skills and patient showed understanding by attending groups. Problem: Anxiety  Goal: Will report anxiety at manageable levels  Description: INTERVENTIONS:  1. Administer medication as ordered  2. Teach and rehearse alternative coping skills  3. Provide emotional support with 1:1 interaction with staff  10/7/2022 2056 by Maggi Sun RN  Outcome: Progressing  Flowsheets (Taken 10/7/2022 2056)  Will report anxiety at manageable levels:   Administer medication as ordered   Provide emotional support with 1:1 interaction with staff   Teach and rehearse alternative coping skills  Note: Patient reports moderate anxiety related to his current life circumstances. Pt was given 1:1 talk time, distraction techniques of working on puzzles and socializing with peers. Pt requested atarax at bedtime to reduce anxiety.

## 2022-10-08 NOTE — GROUP NOTE
Group Therapy Note    Date: 10/7/2022    Group Start Time: 2030  Group End Time: 2100  Group Topic: Wrap-Up    [facility]        Group Therapy Note    Attendees: 9/20 for goal/ \"time capsule\" activity group       Patient's Goal:  buy land in [facility]    Notes:  good participation    Status After Intervention:  Improved    Participation Level: Interactive    Participation Quality: Appropriate      Speech:  normal      Thought Process/Content: Logical      Affective Functioning: Congruent      Mood: depressed      Level of consciousness:  Alert      Response to Learning: Able to verbalize current knowledge/experience      Endings: None Reported    Modes of Intervention: Support and Exploration      Discipline Responsible: Behavorial Health Tech      Signature:  Martita Amador shaking, generalized

## 2022-10-08 NOTE — PROGRESS NOTES
Daily Progress Note  10/8/2022    Patient Name: Zeinab Jorgensen    CHIEF COMPLAINT: Suicidal ideation with multiple plans and access         SUBJECTIVE:     Patient seen face-to-face for follow-up assessment. He is sitting in his room and is cooperative with discussion. Patient reports that he has spoken with his wife and has been more accepting of their current relationship status. He states that she has requested that he find another place to live when he is discharged from the hospital and he has taken that news fairly well. He does express that he had multiple episodes of crying spells and feels very low regarding the breakdown of his relationship, but is not feeling as hopeless as he was when he was first admitted to unit. Patient does express that he has support in his family and that his father will let him stay with him when he is discharged. Patient was started on sertraline 25 mg to help with his mood. We reviewed side effects and he denies all. Does express that he had headache this morning but verbalizes that he has a dependency on caffeine and has not had any caffeine in the last 36 hours. He utilized ibuprofen and acetaminophen with some benefit. Patient requesting Excedrin to help with headache. Discussed titrating his sertraline to 50 mg tomorrow morning and patient is in agreement. Patient not yet able to contract for safety off unit. Requires continued hospitalization for safety.      Psych med compliant: [x] Yes    [] No     E-meds in last 24 hrs: [] Yes   [x] No    Appetite:  [x] Normal/Adequate/Unchanged  [] Increased  [] Decreased      Sleep:       [] Normal/Adequate/Unchanged  [x] Fair  [] Poor      Group Attendance:   [x] Yes  [] Selectively    [] No    Medication Side Effects: Denies         Mental Status Exam  Level of consciousness: Alert and awake   Appearance: Appropriate attire for setting, seated on bed, with fair  grooming and hygiene   Behavior/Motor: Approachable, no psychomotor abnormalities   Attitude toward examiner: Cooperative, attentive, good eye contact   Speech: spontaneous, normal rate, normal volume, and well articulated   Mood:  patient reports \"down, but I'm trying to be hopeful\"  Affect: sad  Thought processes: linear and goal directed   Thought content: Denies homicidal ideation  Suicidal Ideation: reports improving suicidal ideation, unable to conrtact for safety off unit  Delusions: denies  Perceptual Disturbance: Denies. Does not appear to be responding to internal stimuli  Cognition: Oriented to self, location, time, and situation  Memory: intact  Insight & Judgement: fair     Data   height is 5' 10\" (1.778 m) and weight is 138 lb (62.6 kg). His oral temperature is 97.9 °F (36.6 °C). His blood pressure is 101/46 (abnormal) and his pulse is 51. His respiration is 16 and oxygen saturation is 99%.    Labs:   Admission on 10/06/2022   Component Date Value Ref Range Status    WBC 10/06/2022 10.5  3.5 - 11.0 k/uL Final    RBC 10/06/2022 5.13  4.5 - 5.9 m/uL Final    Hemoglobin 10/06/2022 14.8  13.5 - 17.5 g/dL Final    Hematocrit 10/06/2022 43.6  41 - 53 % Final    MCV 10/06/2022 85.0  80 - 100 fL Final    MCH 10/06/2022 28.8  26 - 34 pg Final    MCHC 10/06/2022 33.8  31 - 37 g/dL Final    RDW 10/06/2022 13.2  11.5 - 14.9 % Final    Platelets 03/99/5498 344  150 - 450 k/uL Final    MPV 10/06/2022 7.7  6.0 - 12.0 fL Final    Seg Neutrophils 10/06/2022 78 (A)  36 - 66 % Final    Lymphocytes 10/06/2022 17 (A)  24 - 44 % Final    Monocytes 10/06/2022 5  1 - 7 % Final    Eosinophils % 10/06/2022 0  0 - 4 % Final    Basophils 10/06/2022 0  0 - 2 % Final    Segs Absolute 10/06/2022 8.30  1.3 - 9.1 k/uL Final    Absolute Lymph # 10/06/2022 1.80  1.0 - 4.8 k/uL Final    Absolute Mono # 10/06/2022 0.50  0.1 - 1.3 k/uL Final    Absolute Eos # 10/06/2022 0.00  0.0 - 0.4 k/uL Final    Basophils Absolute 10/06/2022 0.00  0.0 - 0.2 k/uL Final    Glucose 10/06/2022 106 (A)  70 - 99 mg/dL Final    BUN 10/06/2022 9  6 - 20 mg/dL Final    Creatinine 10/06/2022 0.93  0.70 - 1.20 mg/dL Final    Est, Glom Filt Rate 10/06/2022 >60  >60 mL/min/1.73m2 Final    Comment:       Effective Oct 3, 2022        These results are not intended for use in patients <25years of age. eGFR results are calculated without a race factor using the 2021 CKD-EPI equation. Careful clinical correlation is recommended, particularly when comparing to results   calculated using previous equations. The CKD-EPI equation is less accurate in patients with extremes of muscle mass, extra-renal   metabolism of creatine, excessive creatine ingestion, or following therapy that affects   renal tubular secretion.       Calcium 10/06/2022 9.6  8.6 - 10.4 mg/dL Final    Sodium 10/06/2022 139  135 - 144 mmol/L Final    Potassium 10/06/2022 4.2  3.7 - 5.3 mmol/L Final    Chloride 10/06/2022 102  98 - 107 mmol/L Final    CO2 10/06/2022 24  20 - 31 mmol/L Final    Anion Gap 10/06/2022 13  9 - 17 mmol/L Final    Alkaline Phosphatase 10/06/2022 59  40 - 129 U/L Final    ALT 10/06/2022 15  5 - 41 U/L Final    AST 10/06/2022 17  <40 U/L Final    Total Bilirubin 10/06/2022 0.7  0.3 - 1.2 mg/dL Final    Total Protein 10/06/2022 7.7  6.4 - 8.3 g/dL Final    Albumin 10/06/2022 4.7  3.5 - 5.2 g/dL Final    Salicylate Lvl 26/15/7573 <1 (A)  3 - 10 mg/dL Final    Acetaminophen Level 10/06/2022 <5 (A)  10 - 30 ug/mL Final    Ethanol 10/06/2022 <10  <10 mg/dL Final    Ethanol percent 10/06/2022 <0.010  % Final    Magnesium 10/06/2022 2.0  1.6 - 2.6 mg/dL Final    Amphetamine Screen, Ur 10/06/2022 NEGATIVE  NEGATIVE Final    Comment:       (Positive cutoff 1000 ng/mL)                  Barbiturate Screen, Ur 10/06/2022 NEGATIVE  NEGATIVE Final    Comment:       (Positive cutoff 200 ng/mL)                  Benzodiazepine Screen, Urine 10/06/2022 NEGATIVE   Final    Comment:       (Positive cutoff 200 ng/mL)                  Cocaine Metabolite, Urine 10/06/2022 NEGATIVE  NEGATIVE Final    Comment:       (Positive cutoff 300 ng/mL)                  Methadone Screen, Urine 10/06/2022 NEGATIVE  NEGATIVE Final    Comment:       (Positive cutoff 300 ng/mL)                  Opiates, Urine 10/06/2022 NEGATIVE  NEGATIVE Final    Comment:       (Positive cutoff 300 ng/mL)                  Phencyclidine, Urine 10/06/2022 NEGATIVE  NEGATIVE Final    Comment:       (Positive cutoff 25 ng/mL)                  Cannabinoid Scrn, Ur 10/06/2022 POSITIVE (A)  NEGATIVE Final    Comment:       (Positive cutoff 50 ng/mL)                  Oxycodone Screen, Ur 10/06/2022 NEGATIVE  NEGATIVE Final    Comment:       (Positive cutoff 100 ng/mL)                  Fentanyl, Ur 10/06/2022 NEGATIVE  NEGATIVE Final    Comment:       (Positive cutoff  5 ng/ml)            Test Information 10/06/2022 Assay provides medical screening only. The absence of expected drug(s) and/or metabolite(s) may indicate diluted or adulterated urine, limitations of testing or timing of collection. Final    Comment: Testing for legal purposes should be confirmed by another method. To request confirmation   of test result, please call the lab within 7 days of sample submission. TSH 10/06/2022 0.91  0.30 - 5.00 uIU/mL Final         Reviewed patient's current plan of care and vital signs with nursing staff.     Labs reviewed: [x] Yes  Last EKG in EMR reviewed: [x] Yes    Medications  Current Facility-Administered Medications: sertraline (ZOLOFT) tablet 25 mg, 25 mg, Oral, Daily  acetaminophen (TYLENOL) tablet 650 mg, 650 mg, Oral, Q6H PRN  ibuprofen (ADVIL;MOTRIN) tablet 400 mg, 400 mg, Oral, Q6H PRN  hydrOXYzine HCl (ATARAX) tablet 50 mg, 50 mg, Oral, TID PRN  traZODone (DESYREL) tablet 50 mg, 50 mg, Oral, Nightly PRN  polyethylene glycol (GLYCOLAX) packet 17 g, 17 g, Oral, Daily PRN  aluminum & magnesium hydroxide-simethicone (MAALOX) 200-200-20 MG/5ML suspension 30 mL, 30 mL, Oral, Q6H PRN  nicotine polacrilex (NICORETTE) gum 2 mg, 2 mg, Oral, Q2H PRN  haloperidol lactate (HALDOL) injection 5 mg, 5 mg, IntraMUSCular, Q6H PRN **AND** diphenhydrAMINE (BENADRYL) injection 50 mg, 50 mg, IntraMUSCular, Q6H PRN  haloperidol (HALDOL) tablet 5 mg, 5 mg, Oral, Q6H PRN    ASSESSMENT  Major depressive disorder, recurrent severe without [facility] features [facility]         PLAN  Patient symptoms are: Modestly improving  Medication changes: Titrate sertraline 50 mg daily starting tomorrow a.m. Add Excedrin to regimen secondary to headache  Determine need for as needed emergency medications  Provide supportive psychotherapy  Encourage participation in groups and milieu. Probable discharge is next week  Follow-up daily while inpatient    Electronically signed by TALI Doshi CNP on 10/8/2022 at 4:18 PM    **This report has been created using voice recognition software. It may contain minor errors which are inherent in voice recognition technology. **

## 2022-10-08 NOTE — GROUP NOTE
Group Therapy Note    Date: 10/8/2022    Group Start Time: 1030  Group End Time: 1115  Group Topic: Psychoeducation    [facility]    KANDY Donaldson        Group Therapy Note    Attendees: 8/22       Patient's Goal:  mindfulness    Notes:  therapeutic worksheet provided and discussed    Status After Intervention:  Improved    Participation Level:  Active Listener and Interactive    Participation Quality: Appropriate and Attentive      Speech:  normal      Thought Process/Content: Logical      Affective Functioning: Congruent      Mood: anxious      Level of consciousness:  Alert and Oriented x4      Response to Learning: Able to verbalize current knowledge/experience and Able to verbalize/acknowledge new learning      Endings: None Reported    Modes of Intervention: Education and Support      Discipline Responsible: /Counselor      Signature:  KANDY Donaldson

## 2022-10-09 PROCEDURE — 6370000000 HC RX 637 (ALT 250 FOR IP): Performed by: PSYCHIATRY & NEUROLOGY

## 2022-10-09 PROCEDURE — 6370000000 HC RX 637 (ALT 250 FOR IP): Performed by: NURSE PRACTITIONER

## 2022-10-09 PROCEDURE — 99232 SBSQ HOSP IP/OBS MODERATE 35: CPT | Performed by: NURSE PRACTITIONER

## 2022-10-09 PROCEDURE — 1240000000 HC EMOTIONAL WELLNESS R&B

## 2022-10-09 RX ADMIN — TRAZODONE HYDROCHLORIDE 50 MG: 50 TABLET ORAL at 22:38

## 2022-10-09 RX ADMIN — HYDROXYZINE HYDROCHLORIDE 50 MG: 50 TABLET ORAL at 22:38

## 2022-10-09 RX ADMIN — SERTRALINE HYDROCHLORIDE 50 MG: 50 TABLET ORAL at 08:25

## 2022-10-09 RX ADMIN — NICOTINE POLACRILEX 2 MG: 2 GUM, CHEWING BUCCAL at 12:55

## 2022-10-09 RX ADMIN — NICOTINE POLACRILEX 2 MG: 2 GUM, CHEWING BUCCAL at 09:39

## 2022-10-09 RX ADMIN — NICOTINE POLACRILEX 2 MG: 2 GUM, CHEWING BUCCAL at 21:50

## 2022-10-09 RX ADMIN — NICOTINE POLACRILEX 2 MG: 2 GUM, CHEWING BUCCAL at 17:56

## 2022-10-09 RX ADMIN — NICOTINE POLACRILEX 2 MG: 2 GUM, CHEWING BUCCAL at 19:31

## 2022-10-09 NOTE — PLAN OF CARE
[address] [place] [address]  Day 3 Interdisciplinary Treatment Plan NOTE    Review Date & Time:  10/9/22 1300    Admission Type:   Admission Type: Voluntary    Reason for admission:  Reason for Admission: Suicidal ideations due to family complications - Wife wanting to seperate  Estimated Length of Stay: 5-7 days  Estimated Discharge Date Update: to be determined by physician    PATIENT STRENGTHS:  Patient Strengths    Patient Strengths and Limitations:Limitations: Difficult relationships / poor social skills, Inappropriate/potentially harmful leisure interests, Difficulty problem solving/relies on others to help solve problems, Multiple barriers to leisure interests, Lacks leisure interests  Addictive Behavior:Addictive Behavior  In the Past 3 Months, Have You Felt or Has Someone Told You That You Have a Problem With  : None  Medical Problems:  Past Medical History:   Diagnosis Date    Depression with suicidal ideation 10/6/2022       Risk:  Fall Risk   Kirk Scale Kirk Scale Score: 22  BVC    Change in scores no Changes to plan of Care no    Status EXAM:   Mental Status and Behavioral Exam  Normal: No  Level of Assistance: Independent/Self  Facial Expression: Brightened  Affect: Appropriate  Level of Consciousness: Alert  Frequency of Checks: 4 times per hour, close  Mood:Normal: No  Mood: Depressed, Anxious  Motor Activity:Normal: Yes  Motor Activity: Decreased  Eye Contact: Good  Observed Behavior: Cooperative, Friendly  Sexual Misconduct History: Current - no  Preception: Hammond to person, Hammond to time, Hammond to place, Hammond to situation  Attention:Normal: Yes  Thought Processes: Circumstantial  Thought Content:Normal: No  Thought Content: Preoccupations  Depression Symptoms: Impaired concentration  Anxiety Symptoms: Generalized  Katt Symptoms: No problems reported or observed.   Hallucinations: None  Delusions: No  Memory:Normal: Yes  Insight and Judgment: No  Insight and Judgment: Poor judgment, Poor insight    Daily Assessment Last Entry:   Daily Sleep (WDL): Within Defined Limits            Daily Nutrition (WDL): Within Defined Limits  Level of Assistance: Independent/Self    Patient Monitoring:  Frequency of Checks: 4 times per hour, close    Psychiatric Symptoms:   Depression Symptoms  Depression Symptoms: Impaired concentration  Anxiety Symptoms  Anxiety Symptoms: Generalized  Katt Symptoms  Katt Symptoms: No problems reported or observed. Suicide Risk CSSR-S:  1) Within the past month, have you wished you were dead or wished you could go to sleep and not wake up? : Yes  2) Have you actually had any thoughts of killing yourself? : Yes  3) Have you been thinking about how you might kill yourself? : No  5) Have you started to work out or worked out the details of how to kill yourself? Do you intend to carry out this plan? : No  6) Have you ever done anything, started to do anything, or prepared to do anything to end your life?: No  Change in Result No Change in Plan of care No      EDUCATION:   EDUCATION:   Learner Progress Toward Treatment Goals: Reviewed results and recommendations of this team, Reviewed group plan and strategies, Reviewed signs, symptoms and risk of self harm and violent behavior, Reviewed goals and plan of care    Method:small group, individual verbal education    Outcome:verbalized by patient, but needs reinforcement to obtain goals    PATIENT GOALS:  Short term: Staying calm with family issues, maintain medication compliance. Long term: Maintain stable mental health, financial stability. Learn how to cope and express feeling.      PLAN/TREATMENT RECOMMENDATIONS UPDATE: continue with group therapies, increased socialization, continue planning for after discharge goals, continue with medication compliance    SHORT-TERM GOALS UPDATE:   Time frame for Short-Term Goals: 5-7 days    LONG-TERM GOALS UPDATE:   Time frame for Long-Term Goals: 6 months  Members Present in Team Meeting: See Signature Sheet    Annalee Wiggins RN

## 2022-10-09 NOTE — GROUP NOTE
Group Therapy Note    Date: 10/9/2022    Group Start Time: 0139  Group End Time: 4364  Group Topic: Psychoeducation    Χαλκοκονδύλη Costa, LSW        Group Therapy Note    Attendees: 6/22       Patient's Goal:  identifying anger    Notes:  therapeutic worksheet provided and discussed     Status After Intervention:  Improved    Participation Level:  Active Listener and Interactive    Participation Quality: Appropriate and Attentive      Speech:  normal      Thought Process/Content: Logical      Affective Functioning: Congruent      Mood: euthymic      Level of consciousness:  Alert and Oriented x4      Response to Learning: Able to verbalize current knowledge/experience and Able to verbalize/acknowledge new learning      Endings: None Reported    Modes of Intervention: Education and Support      Discipline Responsible: /Counselor      Signature:  Daryle Ripple, LSW

## 2022-10-09 NOTE — PLAN OF CARE
Problem: Self Harm/Suicidality  Goal: Will have no self-injury during hospital stay  Description: INTERVENTIONS:  1. Q 30 MINUTES: Routine safety checks  2. Q SHIFT & PRN: Assess risk to determine if routine checks are adequate to maintain patient safety  Outcome: Progressing  Note: Patient denies thoughts of harm to self or others. Visual safety checks are completed every 15 minutes and randomly. Problem: Depression  Goal: Will be euthymic at discharge  Description: INTERVENTIONS:  1. Administer medication as ordered  2. Provide emotional support via 1:1 interaction with staff  3. Encourage involvement in milieu/groups/activities  4. Monitor for social isolation  Outcome: Progressing  Note: Patient reports symptoms are improving. He is compliant with medications. Patient is social with peers and attending groups. Problem: Anxiety  Goal: Will report anxiety at manageable levels  Description: INTERVENTIONS:  1. Administer medication as ordered  2. Teach and rehearse alternative coping skills  3. Provide emotional support with 1:1 interaction with staff  Outcome: Progressing  Flowsheets (Taken 10/9/2022 1113)  Will report anxiety at manageable levels:   Teach and rehearse alternative coping skills   Administer medication as ordered   Provide emotional support with 1:1 interaction with staff  Note: Patient reports having some anxiety related to situations at home. Patient reports that this is improving. Problem: Pain  Goal: Verbalizes/displays adequate comfort level or baseline comfort level  Outcome: Progressing  Flowsheets (Taken 10/8/2022 1500)  Verbalizes/displays adequate comfort level or baseline comfort level:   Encourage patient to monitor pain and request assistance   Assess pain using appropriate pain scale   Administer analgesics based on type and severity of pain and evaluate response  Note: Patient denies pain thus far, this shift.

## 2022-10-09 NOTE — PLAN OF CARE
Problem: Self Harm/Suicidality  Goal: Will have no self-injury during hospital stay  Description: INTERVENTIONS:  1. Q 30 MINUTES: Routine safety checks  2. Q SHIFT & PRN: Assess risk to determine if routine checks are adequate to maintain patient safety  10/8/2022 2327 by Everett Whittington LPN  Outcome: Progressing     Problem: Depression  Goal: Will be euthymic at discharge  Description: INTERVENTIONS:  1. Administer medication as ordered  2. Provide emotional support via 1:1 interaction with staff  3. Encourage involvement in milieu/groups/activities  4. Monitor for social isolation  10/8/2022 2327 by Everett Whittington LPN  Outcome: Progressing     Problem: Anxiety  Goal: Will report anxiety at manageable levels  Description: INTERVENTIONS:  1. Administer medication as ordered  2. Teach and rehearse alternative coping skills  3. Provide emotional support with 1:1 interaction with staff  10/8/2022 2327 by Everett Whittington LPN  Outcome: Progressing   Denies suicidal thoughts and remains free from harm at this time. Reports decreased depression but continued anxiety. Accepting of medications and social with peers.

## 2022-10-09 NOTE — PROGRESS NOTES
Daily Progress Note  10/9/2022    Patient Name: Lanice Apgar Reeder    CHIEF COMPLAINT: Suicidal ideation with multiple plans and access         SUBJECTIVE:     Patient seen face-to-face for follow-up assessment. He is sitting in the day area and is cooperative with discussion. Patient reports that he is starting to feel more hopeful and was observed smiling with peers today. He continues to have difficulty accepting the breakdown of his relationship with his wife but verbalizes that he wants to keep living and has not had thoughts to end his life today. Patient does verbalize racing thoughts and has found it difficult to fall asleep the previous evening. Denies fatigue today and is expressing good energy and motivation. Staff report that patient has been engaged in his treatment and attending group programming. His sertraline was titrated to 50 mg this morning and patient denies any side effects. Patient symptoms are improving and plan to discharge soon with stability.     Psych med compliant: [x] Yes    [] No     E-meds in last 24 hrs: [] Yes   [x] No    Appetite:  [x] Normal/Adequate/Unchanged  [] Increased  [] Decreased      Sleep:       [] Normal/Adequate/Unchanged  [x] Fair  [] Poor      Group Attendance:   [x] Yes  [] Selectively    [] No    Medication Side Effects: Denies         Mental Status Exam  Level of consciousness: Alert and awake   Appearance: Appropriate attire for setting, seated on chair, with good grooming and hygiene  Behavior/Motor: Approachable, no psychomotor abnormalities   Attitude toward examiner: Cooperative, attentive, good eye contact   Speech: spontaneous, normal rate, normal volume, and well articulated   Mood:  patient reports \"feeling better today\"  Affect: sad  Thought processes: linear and goal directed   Thought content: Denies homicidal ideation  Suicidal Ideation: reports improving suicidal ideation, unable to conrtact for safety off unit  Delusions: denies  Perceptual Disturbance: Denies. Does not appear to be responding to internal stimuli  Cognition: Oriented to self, location, time, and situation  Memory: intact  Insight & Judgement: fair     Data   height is 5' 10\" (1.778 m) and weight is 138 lb (62.6 kg). His oral temperature is 97.8 °F (36.6 °C). His blood pressure is 114/61 and his pulse is 53. His respiration is 16 and oxygen saturation is 99%. Labs:   Admission on 10/06/2022   Component Date Value Ref Range Status    WBC 10/06/2022 10.5  3.5 - 11.0 k/uL Final    RBC 10/06/2022 5.13  4.5 - 5.9 m/uL Final    Hemoglobin 10/06/2022 14.8  13.5 - 17.5 g/dL Final    Hematocrit 10/06/2022 43.6  41 - 53 % Final    MCV 10/06/2022 85.0  80 - 100 fL Final    MCH 10/06/2022 28.8  26 - 34 pg Final    MCHC 10/06/2022 33.8  31 - 37 g/dL Final    RDW 10/06/2022 13.2  11.5 - 14.9 % Final    Platelets 54/07/9608 344  150 - 450 k/uL Final    MPV 10/06/2022 7.7  6.0 - 12.0 fL Final    Seg Neutrophils 10/06/2022 78 (A)  36 - 66 % Final    Lymphocytes 10/06/2022 17 (A)  24 - 44 % Final    Monocytes 10/06/2022 5  1 - 7 % Final    Eosinophils % 10/06/2022 0  0 - 4 % Final    Basophils 10/06/2022 0  0 - 2 % Final    Segs Absolute 10/06/2022 8.30  1.3 - 9.1 k/uL Final    Absolute Lymph # 10/06/2022 1.80  1.0 - 4.8 k/uL Final    Absolute Mono # 10/06/2022 0.50  0.1 - 1.3 k/uL Final    Absolute Eos # 10/06/2022 0.00  0.0 - 0.4 k/uL Final    Basophils Absolute 10/06/2022 0.00  0.0 - 0.2 k/uL Final    Glucose 10/06/2022 106 (A)  70 - 99 mg/dL Final    BUN 10/06/2022 9  6 - 20 mg/dL Final    Creatinine 10/06/2022 0.93  0.70 - 1.20 mg/dL Final    Est, Glom Filt Rate 10/06/2022 >60  >60 mL/min/1.73m2 Final    Comment:       Effective Oct 3, 2022        These results are not intended for use in patients <25years of age. eGFR results are calculated without a race factor using the 2021 CKD-EPI equation.   Careful clinical correlation is recommended, particularly when comparing to results calculated using previous equations. The CKD-EPI equation is less accurate in patients with extremes of muscle mass, extra-renal   metabolism of creatine, excessive creatine ingestion, or following therapy that affects   renal tubular secretion.       Calcium 10/06/2022 9.6  8.6 - 10.4 mg/dL Final    Sodium 10/06/2022 139  135 - 144 mmol/L Final    Potassium 10/06/2022 4.2  3.7 - 5.3 mmol/L Final    Chloride 10/06/2022 102  98 - 107 mmol/L Final    CO2 10/06/2022 24  20 - 31 mmol/L Final    Anion Gap 10/06/2022 13  9 - 17 mmol/L Final    Alkaline Phosphatase 10/06/2022 59  40 - 129 U/L Final    ALT 10/06/2022 15  5 - 41 U/L Final    AST 10/06/2022 17  <40 U/L Final    Total Bilirubin 10/06/2022 0.7  0.3 - 1.2 mg/dL Final    Total Protein 10/06/2022 7.7  6.4 - 8.3 g/dL Final    Albumin 10/06/2022 4.7  3.5 - 5.2 g/dL Final    Salicylate Lvl 49/00/3820 <1 (A)  3 - 10 mg/dL Final    Acetaminophen Level 10/06/2022 <5 (A)  10 - 30 ug/mL Final    Ethanol 10/06/2022 <10  <10 mg/dL Final    Ethanol percent 10/06/2022 <0.010  % Final    Magnesium 10/06/2022 2.0  1.6 - 2.6 mg/dL Final    Amphetamine Screen, Ur 10/06/2022 NEGATIVE  NEGATIVE Final    Comment:       (Positive cutoff 1000 ng/mL)                  Barbiturate Screen, Ur 10/06/2022 NEGATIVE  NEGATIVE Final    Comment:       (Positive cutoff 200 ng/mL)                  Benzodiazepine Screen, Urine 10/06/2022 NEGATIVE   Final    Comment:       (Positive cutoff 200 ng/mL)                  Cocaine Metabolite, Urine 10/06/2022 NEGATIVE  NEGATIVE Final    Comment:       (Positive cutoff 300 ng/mL)                  Methadone Screen, Urine 10/06/2022 NEGATIVE  NEGATIVE Final    Comment:       (Positive cutoff 300 ng/mL)                  Opiates, Urine 10/06/2022 NEGATIVE  NEGATIVE Final    Comment:       (Positive cutoff 300 ng/mL)                  Phencyclidine, Urine 10/06/2022 NEGATIVE  NEGATIVE Final    Comment:       (Positive cutoff 25 ng/mL) Cannabinoid Scrn, Ur 10/06/2022 POSITIVE (A)  NEGATIVE Final    Comment:       (Positive cutoff 50 ng/mL)                  Oxycodone Screen, Ur 10/06/2022 NEGATIVE  NEGATIVE Final    Comment:       (Positive cutoff 100 ng/mL)                  Fentanyl, Ur 10/06/2022 NEGATIVE  NEGATIVE Final    Comment:       (Positive cutoff  5 ng/ml)            Test Information 10/06/2022 Assay provides medical screening only. The absence of expected drug(s) and/or metabolite(s) may indicate diluted or adulterated urine, limitations of testing or timing of collection. Final    Comment: Testing for legal purposes should be confirmed by another method. To request confirmation   of test result, please call the lab within 7 days of sample submission. TSH 10/06/2022 0.91  0.30 - 5.00 uIU/mL Final         Reviewed patient's current plan of care and vital signs with nursing staff.     Labs reviewed: [x] Yes  Last EKG in EMR reviewed: [x] Yes    Medications  Current Facility-Administered Medications: sertraline (ZOLOFT) tablet 50 mg, 50 mg, Oral, Daily  aspirin-acetaminophen-caffeine (EXCEDRIN MIGRAINE) per tablet 1 tablet, 1 tablet, Oral, Q6H PRN  ibuprofen (ADVIL;MOTRIN) tablet 400 mg, 400 mg, Oral, Q6H PRN  hydrOXYzine HCl (ATARAX) tablet 50 mg, 50 mg, Oral, TID PRN  traZODone (DESYREL) tablet 50 mg, 50 mg, Oral, Nightly PRN  polyethylene glycol (GLYCOLAX) packet 17 g, 17 g, Oral, Daily PRN  aluminum & magnesium hydroxide-simethicone (MAALOX) 200-200-20 MG/5ML suspension 30 mL, 30 mL, Oral, Q6H PRN  nicotine polacrilex (NICORETTE) gum 2 mg, 2 mg, Oral, Q2H PRN  haloperidol lactate (HALDOL) injection 5 mg, 5 mg, IntraMUSCular, Q6H PRN **AND** diphenhydrAMINE (BENADRYL) injection 50 mg, 50 mg, IntraMUSCular, Q6H PRN  haloperidol (HALDOL) tablet 5 mg, 5 mg, Oral, Q6H PRN    ASSESSMENT  Major depressive disorder, recurrent severe without psychotic features ([facility] [address]         PLAN  Patient symptoms are: Modestly improving  Medication changes: Sertraline titrated 50 mg this morning. Observe on current dose  Determine need for as needed emergency medications  Provide supportive psychotherapy  Encourage participation in groups and milieu. Probable discharge is 1-2 days with stability  Follow-up daily while inpatient    Electronically signed by TALI Sargent CNP on 10/9/2022 at 4:37 PM    **This report has been created using voice recognition software. It may contain minor errors which are inherent in voice recognition technology. **

## 2022-10-10 VITALS
BODY MASS INDEX: 19.76 KG/M2 | SYSTOLIC BLOOD PRESSURE: 108 MMHG | DIASTOLIC BLOOD PRESSURE: 63 MMHG | HEIGHT: 70 IN | HEART RATE: 61 BPM | RESPIRATION RATE: 14 BRPM | WEIGHT: 138 LBS | OXYGEN SATURATION: 99 % | TEMPERATURE: 98 F

## 2022-10-10 PROCEDURE — 6370000000 HC RX 637 (ALT 250 FOR IP): Performed by: NURSE PRACTITIONER

## 2022-10-10 PROCEDURE — 6370000000 HC RX 637 (ALT 250 FOR IP): Performed by: PSYCHIATRY & NEUROLOGY

## 2022-10-10 PROCEDURE — 99239 HOSP IP/OBS DSCHRG MGMT >30: CPT | Performed by: PSYCHIATRY & NEUROLOGY

## 2022-10-10 RX ORDER — TRAZODONE HYDROCHLORIDE 50 MG/1
50 TABLET ORAL NIGHTLY PRN
Qty: 30 TABLET | Refills: 0 | Status: SHIPPED | OUTPATIENT
Start: 2022-10-10

## 2022-10-10 RX ORDER — HYDROXYZINE 50 MG/1
50 TABLET, FILM COATED ORAL 3 TIMES DAILY PRN
Qty: 30 TABLET | Refills: 0 | Status: SHIPPED | OUTPATIENT
Start: 2022-10-10 | End: 2022-10-20

## 2022-10-10 RX ADMIN — IBUPROFEN 400 MG: 400 TABLET ORAL at 06:56

## 2022-10-10 RX ADMIN — NICOTINE POLACRILEX 2 MG: 2 GUM, CHEWING BUCCAL at 09:50

## 2022-10-10 RX ADMIN — SERTRALINE HYDROCHLORIDE 50 MG: 50 TABLET ORAL at 08:34

## 2022-10-10 ASSESSMENT — PAIN SCALES - GENERAL
PAINLEVEL_OUTOF10: 5
PAINLEVEL_OUTOF10: 7

## 2022-10-10 ASSESSMENT — PAIN - FUNCTIONAL ASSESSMENT: PAIN_FUNCTIONAL_ASSESSMENT: ACTIVITIES ARE NOT PREVENTED

## 2022-10-10 ASSESSMENT — PAIN DESCRIPTION - DESCRIPTORS
DESCRIPTORS: SHARP
DESCRIPTORS: ACHING

## 2022-10-10 ASSESSMENT — PAIN DESCRIPTION - LOCATION
LOCATION: HEAD
LOCATION: HEAD

## 2022-10-10 NOTE — GROUP NOTE
Group Therapy Note    Date: 10/10/2022    Group Start Time: 0900  Group End Time: 0945  Group Topic: Group Therapy    FITO Lanier        Group Therapy Note    Attendees: 7/21         Patient's Goal:  redirect self when experiencing overwhelming thoughts  Notes:  morning goal group session    Status After Intervention:  Improved    Participation Level:  Active Listener and Interactive    Participation Quality: Appropriate, Attentive, Sharing, and Supportive      Speech:  normal      Thought Process/Content: Logical      Affective Functioning: Congruent      Mood: euthymic      Level of consciousness:  Alert, Oriented x4, and Attentive      Response to Learning: Capable of insight, Able to change behavior, and Progressing to goal      Endings: None Reported    Modes of Intervention: Support and Socialization      Discipline Responsible: Behavorial Health Tech      Signature:  Joleen Buerger

## 2022-10-10 NOTE — DISCHARGE INSTRUCTIONS
Information:  Medications:   Medication summary provided   I understand that I should take only the medications on my list.     -why and when I need to take each medicine.     -which side effects to watch for.     -that I should carry my medication information at all times in case of     Emergency situations. I will take all of my medicines to follow up appointments.     -check with my physician or pharmacist before taking any new    Medication, over the counter product or drink alcohol.    -Ask about food, drug or dietary supplement interactions.    -discard old lists and update records with medication providers. Notify Physician:  Notify physician if you notice:   Always call 911 if you feel your life is in danger  In case of an emergency call 911 immediately! If 911 is not available call your local emergency medical system for help    Behavioral Health Follow Up:  Original Referral Source:ALEXANDER  Discharge Diagnosis: Depression with suicidal ideation [F32. A, R45.851]  Recommendations for Level of Care: Follow up with outpatient facility  Patient status at discharge: Stable, Verbalizes readiness for discharge  [facility]  was: [facility]  Aftercare plan faxed: Yes   -faxed by: Staff   -date: 10/10/22   -time: 1300  Prescriptions:  medications from "[facility]     Smoking: Quit Smoking. Call the [facility]'s smoking quitline at 2-831-54K-QUIT  Know the signs of a heart attack   If you have any of the following symptoms call 911 immediately, do not wait more    Than five minutes. 1. Pressure, fullness and/ or squeezing in the center of the chest spreading to    The jaw, neck or shoulder. 2. Chest discomfort with light headedness, fainting, sweating, nausea or    Shortness of breath. 3. Upper abdominal pressure or discomfort. 4. Lower chest pain, back pain, unusual fatigue, shortness of breath, nausea   Or dizziness.      General Information:   Questions regarding your bill: Call HELP program [phone]     Suicide Hotline (Gaston Kapoor)  [phone]      Recovery Help line- [phone]      To obtain results of pending studies call Medical Records at: [phone]     For emergencies and 24 hour/7 days a week contact information:  [phone]        Learning About COVID-19 and Flu Symptoms  How can you tell COVID-19 from the flu? COVID-19 and the flu have similar symptoms. The two can be hard to tell apart. The only way to know for sure which illness you have is to be tested. If you have questions about COVID-19 testing, ask your doctor or go to cdc.gov to use the COVID-19 Viral Testing Tool. Since the symptoms are so alike, it makes sense to act as if you have COVID-19 until your test results come back. This means staying home and limiting contact with people in your home. You'll need to wash your hands often and disinfect surfaces that you touch. And be sure to wear a mask when you're around other people. This is also good advice if you think you have the flu. COVID-19 and the flu have these symptoms in common:  Fever or chills  Cough  Shortness of breath  Fatigue (tiredness)  Sore throat  Runny or stuffy nose  Muscle and body aches  Headache  Vomiting and diarrhea (more common in children than adults)  COVID-19 has another symptom that also may occur:  New loss of taste or smell  COVID-19 symptoms may appear from 2 to 14 days after infection. Flu symptoms usually appear 1 to 4 days after infection. Why should you get the flu vaccine? It's important to get your yearly flu vaccine. Both the flu and COVID-19 can be active at the same time. You can get sick with both infections at once. And having both may make you more sick than getting just one. The flu vaccine won't protect you from COVID-19. But it can help prevent the flu or reduce its symptoms.  If fewer people get very ill with the flu, this will help free up medical resources that are needed for people who need urgent care, such as those suffering from COVID-19, heart attacks, and injuries from accidents. Where can you learn more? Go to [email or web address] org and sign in to your [employer] account. Enter C123 in the [facility] box to learn more about \"Learning About COVID-19 and Flu Symptoms. \"     If you do not have an account, please click on the \"Sign Up Now\" link. Current as of: July 28, 2022               Content Version: 13.4  © 2006-2022 [employer]. Care instructions adapted under license by [facility] ([facility]). If you have questions about a medical condition or this instruction, always ask your healthcare professional. [address] any warranty or liability for your use of this information. trazodone  Pronunciation:  Bruce Knott oh done  Brand:  Edinson  What is the most important information I should know about trazodone? Some young people have thoughts about suicide when first taking an antidepressant. Stay alert to changes in your mood or symptoms. Report any new or worsening symptoms to your doctor. Trazodone is not approved for use by anyone younger than 25years old. What is trazodone? Trazodone is an antidepressant that is used to treat major depressive disorder. Trazodone may also be used for purposes not listed in this medication guide. What should I discuss with my healthcare provider before taking trazodone? You should not use trazodone if you are allergic to it. Do not use trazodone if you have used an MAO inhibitor in the past 14 days. A dangerous drug interaction could occur. MAO inhibitors include isocarboxazid, linezolid, methylene blue injection, phenelzine, tranylcypromine, and others. After you stop taking trazodone, you must wait at least 14 days before you start taking an MAOI.    Tell your doctor if you also take stimulant medicine, opioid medicine, herbal products, or medicine for depression, mental illness, Parkinson's disease, migraine headaches, serious infections, or prevention of nausea and vomiting. An interaction with trazodone could cause a serious condition called serotonin syndrome. Tell your doctor if you have ever had:  liver or kidney disease;  heart disease, or a recent heart attack;  a bleeding or blood clotting disorder;  seizures or epilepsy;  narrow-angle glaucoma;  long QT syndrome;  drug addiction or suicidal thoughts; or  bipolar disorder (manic depression). Some young people have thoughts about suicide when first taking an antidepressant. Your doctor should check your progress at regular visits. Your family or other caregivers should also be alert to changes in your mood or symptoms. Taking this medicine during pregnancy could harm the baby, but stopping the medicine may not be safe for you. Do not start or stop trazodone without asking your doctor. If you are pregnant, your name may be listed on a pregnancy registry to track the effects of trazodone on the baby. Ask a doctor if it is safe to breastfeed while using this medicine. Trazodone is not approved for use by anyone younger than 25years old. How should I take trazodone? Follow all directions on your prescription label and read all medication guides or instruction sheets. Your doctor may occasionally change your dose. Use the medicine exactly as directed. Take trazodone after a meal or a snack. Your symptoms may not improve for up to 2 weeks. Do not stop using trazodone suddenly, or you could have unpleasant symptoms (such as agitation, confusion, tingling or electric shock feelings). Ask your doctor before stopping the medicine. Store at room temperature away from moisture, heat, and light. What happens if I miss a dose? Take the medicine as soon as you can, but skip the missed dose if it is almost time for your next dose. Do not take two doses at one time. What happens if I overdose?   Seek emergency medical attention or call the Poison Help line at 1-170.415.9420. An overdose can be fatal when trazodone is taken with alcohol, barbiturates such as phenobarbital, or sedatives such as diazepam (Valium). Overdose symptoms may include extreme drowsiness, vomiting, penis erection that is painful or prolonged, fast or pounding heartbeat, seizure (black-out or convulsions), or breathing that slows or stops. What should I avoid while taking trazodone? Do not drink alcohol. Dangerous side effects or death could occur. Ask your doctor before taking a nonsteroidal anti-inflammatory drug (NSAID) such as aspirin, ibuprofen, naproxen, Advil, Aleve, Motrin, and others. Using an NSAID with trazodone may cause you to bruise or bleed easily. Avoid driving or hazardous activity until you know how this medicine will affect you. Your reactions could be impaired. Avoid getting up too fast from a sitting or lying position, or you may feel dizzy. What are the possible side effects of trazodone? Get emergency medical help if you have signs of an allergic reaction:  hives; difficulty breathing; swelling of your face, lips, tongue, or throat. Stop taking trazodone and call your doctor at once if you have a penis erection that is painful or lasts 6 hours or longer. This is a medical emergency and could lead to a serious condition that must be corrected with surgery. Report any new or worsening symptoms to your doctor, such as: mood or behavior changes, anxiety, panic attacks, trouble sleeping, or if you feel impulsive, irritable, agitated, hostile, aggressive, restless, hyperactive (mentally or physically), more depressed, or have thoughts about suicide or hurting yourself.   Call your doctor at once if you have:  fast or pounding heartbeats, fluttering in your chest, shortness of breath, and sudden dizziness (like you might pass out);  slow heartbeats;  unusual thoughts or behavior;  easy bruising, unusual bleeding; or  low levels of sodium in the body --headache, confusion, slurred speech, severe weakness, vomiting, loss of coordination, feeling unsteady. Seek medical attention right away if you have symptoms of serotonin syndrome, such as: agitation, hallucinations, fever, sweating, shivering, fast heart rate, muscle stiffness, twitching, loss of coordination, nausea, vomiting, or diarrhea. Common side effects may include:  drowsiness, dizziness, tiredness;  swelling;  weight loss;  blurred vision;  diarrhea, constipation; or  stuffy nose. This is not a complete list of side effects and others may occur. Call your doctor for medical advice about side effects. You may report side effects to FDA at 8-079-OPX-6976. What other drugs will affect trazodone? Using trazodone with other drugs that make you drowsy can worsen this effect. Ask your doctor before using opioid medication, a sleeping pill, a muscle relaxer, or medicine for anxiety or seizures. Tell your doctor about all your current medicines. Many drugs can affect trazodone, especially:  any other antidepressants;  phenytoin;  Anahi's wort;  tramadol;  a diuretic or \"water pill\";  medicine to treat anxiety, mood disorders, or mental illness such as schizophrenia;  a blood thinner --warfarin, Coumadin, Jantoven; or  migraine headache medicine --sumatriptan, Imitrex, Maxalt, Treximet, and others. This list is not complete and many other drugs may affect trazodone. This includes prescription and over-the-counter medicines, vitamins, and herbal products. Not all possible drug interactions are listed here. Where can I get more information? Your pharmacist can provide more information about trazodone. Remember, keep this and all other medicines out of the reach of children, never share your medicines with others, and use this medication only for the indication prescribed.    Every effort has been made to ensure that the information provided by [place] accurate, up-to-date, and complete, but no guarantee is made to that effect. Drug information contained herein may be time sensitive. [employer] information has been compiled for use by healthcare practitioners and consumers in the [place] and therefore [employer] does not warrant that uses outside of the [place] are appropriate, unless specifically indicated otherwise. [facility] drug information does not endorse drugs, diagnose patients or recommend therapy. [employer] drug information is an informational resource designed to assist licensed healthcare practitioners in caring for their patients and/or to serve consumers viewing this service as a supplement to, and not a substitute for, the expertise, skill, knowledge and judgment of healthcare practitioners. The absence of a warning for a given drug or drug combination in no way should be construed to indicate that the drug or drug combination is safe, effective or appropriate for any given patient. [facility] does not assume any responsibility for any aspect of healthcare administered with the aid of information [facility] provides. The information contained herein is not intended to cover all possible uses, directions, precautions, warnings, drug interactions, allergic reactions, or adverse effects. If you have questions about the drugs you are taking, check with your doctor, nurse or pharmacist.  Copyright 4452-6874 [address]: 10.04. Revision date: 6/14/2021. Care instructions adapted under license by [facility] ([facility]). If you have questions about a medical condition or this instruction, always ask your healthcare professional. [place] [address] any warranty or liability for your use of this information. sertraline  Pronunciation:  SER tra paul  What is the most important information I should know about sertraline? Some young people have thoughts about suicide when first taking an antidepressant.  Stay alert to changes in your mood or symptoms. Report any new or worsening symptoms to your doctor. Do not stop using sertraline without first asking your doctor. What is sertraline? Sertraline is a selective serotonin reuptake inhibitor (SSRI). Sertraline is used to treat major depressive disorder, obsessive-compulsive disorder (OCD), panic disorder, social anxiety disorder (SAD), and post-traumatic stress disorder (PTSD). Sertraline is also used to treat premenstrual dysphoric disorder. Sertraline may also be used for purposes not listed in this medication guide. What should I discuss with my healthcare provider before taking sertraline? You should not use sertraline if you are allergic to it, or if you also take pimozide. Do not use the liquid form of sertraline  if you take disulfiram (Antabuse). Do not use sertraline within 14 days before or 14 days after using an MAO inhibitor. A dangerous drug interaction could occur. MAO inhibitors include isocarboxazid, linezolid, methylene blue injection, phenelzine, tranylcypromine, and others. Tell your doctor if you also take stimulant medicine, opioid medicine, herbal products, or medicine for depression, mental illness, Parkinson's disease, migraine headaches, serious infections, or prevention of nausea and vomiting. An interaction with sertraline could cause a serious condition called serotonin syndrome. Tell your doctor if you have ever had:  an allergy or sensitivity to aspirin or tartrazine (food dye);  bipolar disorder (manic depression);  heart disease, high blood pressure, or a stroke;  liver or kidney disease;  seizures;  sexual problems;  glaucoma;  bleeding problems, or if you take warfarin (Coumadin, Jantoven);  long QT syndrome; or  low levels of sodium in your blood. Some young people have thoughts about suicide when first taking an antidepressant. Your doctor should check your progress at regular visits.  Your family or other caregivers should also be alert to changes in your mood or symptoms. Sertraline is approved for use in children at least 10years old, only to treat obsessive-compulsive disorder but not depression. If you are pregnant, your name may be listed on a pregnancy registry to track the effects of sertraline on the baby. Taking this medicine during pregnancy could harm the baby, but stopping the medicine may not be safe for you. Do not start or stop sertraline without asking your doctor. Ask a doctor if it is safe to breastfeed while using this medicine. How should I take sertraline? Follow all directions on your prescription label and read all medication guides or instruction sheets. Your doctor may occasionally change your dose. Use the medicine exactly as directed. Take sertraline with or without food, at the same time each day. Sertraline liquid (oral concentrate) must be diluted with a liquid right before you take it. Read and carefully follow all mixing instructions provided with your medicine. Ask your doctor or pharmacist if you need help. Swallow the capsule whole and do not crush, chew, break, or open it. Tell your doctor if you have any changes in sexual function, such as loss of interest in sex, trouble having an orgasm, or (in men) problems with erections or ejaculation. Some sexual problems can be treated. Measure the mixed medicine with the supplied syringe or a dose-measuring device (not a kitchen spoon). Sertraline may cause false results on a drug-screening urine test. Tell the laboratory staff that you use sertraline. Do not stop using sertraline suddenly, or you could have unpleasant symptoms (such as agitation, confusion, tingling or electric shock feelings). Ask your doctor before stopping the medicine. Store tightly closed at room temperature, away from moisture and heat. What happens if I miss a dose? Take the medicine as soon as you can, but skip the missed dose if it is almost time for your next dose.  Do not take two doses at one time. What happens if I overdose? Seek emergency medical attention or call the Poison Help line at 1-923.221.5076. What should I avoid while taking sertraline? Drinking alcohol with this medicine can cause side effects. Avoid driving or hazardous activity until you know how this medicine will affect you. Your reactions could be impaired. What are the possible side effects of sertraline? Get emergency medical help if you have signs of an allergic reaction: skin rash or hives (with or without fever or joint pain); difficulty breathing; swelling of your face, lips, tongue, or throat. Report any new or worsening symptoms to your doctor, such as: mood or behavior changes, anxiety, panic attacks, trouble sleeping, or if you feel impulsive, irritable, agitated, hostile, aggressive, restless, hyperactive (mentally or physically), more depressed, or have thoughts about suicide or hurting yourself. Call your doctor at once if you have:  unusual bleeding or bruising;  a seizure;  vision changes, eye pain, redness, or swelling;  low blood sodium --headache, confusion, problems with thinking or memory, weakness, feeling unsteady; or  manic episodes --racing thoughts, increased energy, unusual risk-taking behavior, extreme happiness, being irritable or talkative. Seek medical attention right away if you have symptoms of serotonin syndrome, such as: agitation, hallucinations, fever, sweating, shivering, fast heart rate, muscle stiffness, twitching, loss of coordination, nausea, vomiting, or diarrhea. Sertraline can affect growth in children. Your child's height and weight may be checked often. Common side effects may include:  indigestion, nausea, diarrhea, loss of appetite, loose stool;  increased sweating;  tremors; or  sexual problems. This is not a complete list of side effects and others may occur. Call your doctor for medical advice about side effects.  You may report side effects to FDA at 1-800-FDA-1088. What other drugs will affect sertraline? Sertraline can cause a serious heart problem. Your risk may be higher if you also use certain other medicines for infections, asthma, heart problems, high blood pressure, depression, mental illness, cancer, malaria, or HIV. Ask your doctor before taking a nonsteroidal anti-inflammatory drug (NSAID) such as aspirin, ibuprofen, naproxen, Advil, Aleve, Motrin, and others. Using an NSAID with sertraline may cause you to bruise or bleed easily. Other drugs may affect sertraline, including prescription and over-the-counter medicines, vitamins, and herbal products. Tell your doctor about all other medicines you use. Where can I get more information? Your doctor or pharmacist can provide more information about sertraline. Remember, keep this and all other medicines out of the reach of children, never share your medicines with others, and use this medication only for the indication prescribed. Every effort has been made to ensure that the information provided by [facility] [address] is accurate, up-to-date, and complete, but no guarantee is made to that effect. Drug information contained herein may be time sensitive. [facility] information has been compiled for use by healthcare practitioners and consumers in the [place] and therefore [facility] does not warrant that uses outside of the [place] are appropriate, unless specifically indicated otherwise. [facility] drug information does not endorse drugs, diagnose patients or recommend therapy. [facility] drug information is an informational resource designed to assist licensed healthcare practitioners in caring for their patients and/or to serve consumers viewing this service as a supplement to, and not a substitute for, the expertise, skill, knowledge and judgment of healthcare practitioners.  The absence of a warning for a given drug or drug combination in no way should be construed to indicate that the drug or drug combination is safe, effective or appropriate for any given patient. [facility] does not assume any responsibility for any aspect of healthcare administered with the aid of information [facility] provides. The information contained herein is not intended to cover all possible uses, directions, precautions, warnings, drug interactions, allergic reactions, or adverse effects. If you have questions about the drugs you are taking, check with your doctor, nurse or pharmacist.  Copyright 2054-2624 [address]: 26.01. Revision date: 5/18/2022. Care instructions adapted under license by [facility] ([facility]). If you have questions about a medical condition or this instruction, always ask your healthcare professional. [place] [address] any warranty or liability for your use of this information. hydroxyzine  Pronunciation:  vladimir DROX ee zeen  Brand:  Vistaril  What is the most important information I should know about hydroxyzine? You should not use hydroxyzine if you are pregnant, especially during the first or second trimester. Hydroxyzine can cause a serious heart problem, especially if you use certain medicines at the same time. Tell your doctor about all your current medicines and any you start or stop using. What is hydroxyzine? Hydroxyzine reduces activity in the central nervous system. It also acts as an antihistamine that reduces the effects of natural chemical histamine in the body. Histamine can produce symptoms of itching, or hives on the skin. Hydroxyzine is used as a sedative to treat anxiety and tension. It is also used together with other medications given during and after general anesthesia. Hydroxyzine is also used to treat allergic skin reactions such as hives or contact dermatitis. Hydroxyzine may also be used for purposes not listed in this medication guide. What should I discuss with my healthcare provider before taking hydroxyzine?   You should not use hydroxyzine if you are allergic to it, or if:  you have long QT syndrome;  you are allergic to cetirizine (Zyrtec) or levocetirizine (Xyzal); or  you are in the first trimester of pregnancy. You should not use hydroxyzine if you are pregnant, especially during the first or second trimester. Hydroxyzine could harm the unborn baby or cause birth defects. Use effective birth control to prevent pregnancy while you are using this medicine. To make sure hydroxyzine is safe for you, tell your doctor if you have:  blockage in your digestive tract (stomach or intestines);  bladder obstruction or other urination problems;  glaucoma;  heart disease, slow heartbeats;  personal or family history of long QT syndrome;  an electrolyte imbalance (such as high or low levels of potassium in your blood);  if you have recently had a heart attack. It is not known whether hydroxyzine passes into breast milk or if it could harm a nursing baby. You should not breast-feed while using this medicine. Do not give this medicine to a child without medical advice. How should I take hydroxyzine? Follow all directions on your prescription label. Your doctor may occasionally change your dose. Do not use this medicine in larger or smaller amounts or for longer than recommended. Shake the oral suspension (liquid) well just before you measure a dose. Measure liquid medicine with the dosing syringe provided, or with a special dose-measuring spoon or medicine cup. If you do not have a dose-measuring device, ask your pharmacist for one. Hydroxyzine is for short-term use only. You should not take this medicine for longer than 4 months. Call your doctor if your anxiety symptoms do not improve, or if they get worse. Store at room temperature away from moisture and heat. What happens if I miss a dose? Take the missed dose as soon as you remember. Skip the missed dose if it is almost time for your next scheduled dose.  Do not take extra medicine to make up the missed dose. What happens if I overdose? Seek emergency medical attention or call the Poison Help line at 1-809.890.2464. Overdose symptoms may include severe drowsiness, nausea, vomiting, uncontrolled muscle movements, or seizure (convulsions). What should I avoid while taking hydroxyzine? This medicine may impair your thinking or reactions. Be careful if you drive or do anything that requires you to be alert. Drinking alcohol with this medicine can cause side effects. What are the possible side effects of hydroxyzine? Get emergency medical help if you have signs of an allergic reaction:  hives; difficult breathing; swelling of your face, lips, tongue, or throat. In rare cases, hydroxyzine may cause a severe skin reaction. Stop taking this medicine and call your doctor right away if you have sudden skin redness or a rash that spreads and causes white or yellow pustules, blistering, or peeling. Stop using hydroxyzine and call your doctor at once if you have:  fast or pounding heartbeats;  headache with chest pain;  severe dizziness, fainting; or  a seizure (convulsions). Side effects such as drowsiness and confusion may be more likely in older adults. Common side effects may include:  drowsiness;  headache;  dry mouth; or  skin rash. This is not a complete list of side effects and others may occur. Call your doctor for medical advice about side effects. You may report side effects to FDA at 2-734-AYZ-9331. What other drugs will affect hydroxyzine? Taking this medicine with other drugs that make you sleepy can worsen this effect. Ask your doctor before taking hydroxyzine with a sleeping pill, narcotic pain medicine, muscle relaxer, or medicine for anxiety, depression, or seizures.   Hydroxyzine can cause a serious heart problem, especially if you use certain medicines at the same time, including antibiotics, antidepressants, heart rhythm medicine, antipsychotic medicines, and medicines to treat cancer, malaria, HIV or AIDS. Tell your doctor about all medicines you use, and those you start or stop using during your treatment with hydroxyzine. Other drugs may interact with hydroxyzine, including prescription and over-the-counter medicines, vitamins, and herbal products. Not all possible interactions are listed here. Tell each of your health care providers about all medicines you use now and any medicine you start or stop using. Where can I get more information? Your pharmacist can provide more information about hydroxyzine. Remember, keep this and all other medicines out of the reach of children, never share your medicines with others, and use this medication only for the indication prescribed. Every effort has been made to ensure that the information provided by [address] is accurate, up-to-date, and complete, but no guarantee is made to that effect. Drug information contained herein may be time sensitive. [facility] information has been compiled for use by healthcare practitioners and consumers in the [place] and therefore [facility] does not warrant that uses outside of the [place] are appropriate, unless specifically indicated otherwise. [facility]'s drug information does not endorse drugs, diagnose patients or recommend therapy. [facility] drug information is an informational resource designed to assist licensed healthcare practitioners in caring for their patients and/or to serve consumers viewing this service as a supplement to, and not a substitute for, the expertise, skill, knowledge and judgment of healthcare practitioners. The absence of a warning for a given drug or drug combination in no way should be construed to indicate that the drug or drug combination is safe, effective or appropriate for any given patient. [facility] does not assume any responsibility for any aspect of healthcare administered with the aid of information [facility] provides.  The information contained herein is not intended to cover all possible uses, directions, precautions, warnings, drug interactions, allergic reactions, or adverse effects. If you have questions about the drugs you are taking, check with your doctor, nurse or pharmacist.  Copyright 4505-9730 [facility] [employer]. Version: 8.01. Revision date: 3/15/2017. Care instructions adapted under license by [facility] ([facility]). If you have questions about a medical condition or this instruction, always ask your healthcare professional. [address] any warranty or liability for your use of this information.

## 2022-10-10 NOTE — PLAN OF CARE
Problem: Self Harm/Suicidality  Goal: Will have no self-injury during hospital stay  Description: INTERVENTIONS:  1. Q 30 MINUTES: Routine safety checks  2. Q SHIFT & PRN: Assess risk to determine if routine checks are adequate to maintain patient safety  10/10/2022 1007 by Charley Marie  Outcome: Progressing  Note: Patient denies experiencing any feelings of depression or anxiety, any thoughts of wanting to harm themself or others, as well as any hallucinations. Patient is able to perform ADL's independently. Patient states that they are eating and sleeping sufficiently. Patient has been attending group sessions and is participating in productive and meaningful ways. Patient is comfortable approaching staff for any requests or questions. Patient remains safe at this time and agrees to notify staff if any thoughts, feelings, or concerns need to be brought to their attention. Q15 minute safety checks maintained.

## 2022-10-10 NOTE — GROUP NOTE
Group Therapy Note    Date: 10/9/2022    Group Start Time: 2030  Group End Time: 2100  Group Topic: Healthy Living/Wellness    [facility] SILVESTRE [facility]ian        Group Therapy Note    Attendees: 11/22 patients for \"importance of laughter\" activity group         Notes:  good participation    Status After Intervention:  Improved    Participation Level: Interactive    Participation Quality: Appropriate      Speech:  normal      Thought Process/Content: Logical      Affective Functioning: Congruent      Mood: euthymic      Level of consciousness:  Alert      Response to Learning: Able to verbalize current knowledge/experience      Endings: None Reported    Modes of Intervention: Socialization and Activity      Discipline Responsible: Behavorial Health Tech      Signature:  Mary Anne Alvarado

## 2022-10-10 NOTE — BH NOTE
[facility]  Discharge Note    Pt discharged with followings belongings:   Dental Appliances: None  Vision - Corrective Lenses: None  Hearing Aid: None  Jewelry: Body Piercing (Ear ring Left hoop)  Body Piercings Removed: No  Clothing: Pants, Shirt, Socks  Other Valuables: Wallet   Valuables sent home with patient. Patient educated on aftercare instructions: Yes  Information faxed to [facility] on [facility] [employer] by RN  at 12:04 PM .Patient verbalize understanding of AVS:  Yes. Status EXAM upon discharge:  Mental Status and Behavioral Exam  Normal: Yes  Level of Assistance: Independent/Self  Facial Expression: Brightened  Affect: Appropriate, Congruent  Level of Consciousness: Alert  Frequency of Checks: 4 times per hour, close  Mood:Normal: Yes  Mood: Other (comment), Elated (euthymic, happy to be discharged)  Motor Activity:Normal: Yes  Motor Activity: Decreased  Eye Contact: Good  Observed Behavior: Cooperative, Friendly  Sexual Misconduct History: Current - no  Preception: [facility] to person, [facility] to time, [facility] to place, [facility] to situation  Attention:Normal: Yes  Thought Processes: Circumstantial  Thought Content:Normal: Yes  Thought Content: Preoccupations  Depression Symptoms: No problems reported or observed. Anxiety Symptoms: No problems reported or observed. Katt Symptoms: No problems reported or observed. Hallucinations: None  Delusions: No  Memory:Normal: Yes  Insight and Judgment: Yes  Insight and Judgment: Poor insight    Tobacco Screening:  Practical Counseling, on admission, nikky X, if applicable and completed (first 3 are required if patient doesn't refuse):             (x ) Recognizing danger situations (included triggers and roadblocks)                    ( x) Coping skills (new ways to manage stress,relaxation techniques, changing routine, distraction)                                                           ( x) Basic information about quitting (benefits of quitting, techniques in how to quit, available resources  ( ) Referral for counseling faxed to [facility]                                                                                                                   ( ) Patient refused counseling  ( ) Patient refused referral  ( ) Patient refused prescription upon discharge  ( ) Patient has not smoked in the last 30 days    Metabolic Screening:    No results found for: LABA1C    No results found for: CHOL  No results found for: TRIG  No results found for: HDL  No components found for: LDLCAL  No results found for: LABVLDL    Patient was discharged from unit at 1200. Patient was ambulatory off unit. Patients 4  door car was in ER parking lot, patient drove himself home. Patient is being discharged to home. Patient verified all belongings before leaving unit.      Trip Burris RN

## 2022-10-10 NOTE — PLAN OF CARE
Problem: Self Harm/Suicidality  Goal: Will have no self-injury during hospital stay  Description: INTERVENTIONS:  1. Q 30 MINUTES: Routine safety checks  2. Q SHIFT & PRN: Assess risk to determine if routine checks are adequate to maintain patient safety  10/9/2022 2312 by Toña West LPN  Outcome: Progressing  Note: Patient denies thoughts of self-injury during hospital stay    Problem: Depression  Goal: Will be euthymic at discharge  Description: INTERVENTIONS:  1. Administer medication as ordered  2. Provide emotional support via 1:1 interaction with staff  3. Encourage involvement in milieu/groups/activities  4. Monitor for social isolation  10/9/2022 2312 by Toña West LPN  Outcome: Progressing  Note: Patient is social with peers at this time. Problem: Anxiety  Goal: Will report anxiety at manageable levels  Description: INTERVENTIONS:  1. Administer medication as ordered  2. Teach and rehearse alternative coping skills  3. Provide emotional support with 1:1 interaction with staff  10/9/2022 2312 by Toña West LPN  Outcome: Progressing  Flowsheets (Taken 10/9/2022 2303)  Will report anxiety at manageable levels: Administer medication as ordered  Note: Pt encouraged to explore coping skills for reducing anxiety. Problem: Pain  Goal: Verbalizes/displays adequate comfort level or baseline comfort level  10/9/2022 2312 by Toña West LPN  Outcome: Progressing  Note: Patient has been calm, controlled and med complaint. Accepting of 1:1 talk time with staff.

## 2022-10-10 NOTE — CARE COORDINATION
Name: Travon Ireland    : 1985    Discharge Date: 10/10/22    Primary Auth/Cert #: [id number]    Destination: home     Discharge Medications:      Medication List        START taking these medications      hydrOXYzine HCl 50 MG tablet  Commonly known as: ATARAX  Take 1 tablet by mouth 3 times daily as needed for Anxiety     sertraline 50 MG tablet  Commonly known as: ZOLOFT  Take 1 tablet by mouth daily  Start taking on: 2022  Notes to patient: Depression      traZODone 50 MG tablet  Commonly known as: DESYREL  Take 1 tablet by mouth nightly as needed for Sleep            CONTINUE taking these medications      Aleve 220 MG tablet  Generic drug: naproxen sodium               Where to Get Your Medications        These medications were sent to [facility] [address] [place] [id number]      Phone: [phone]   hydrOXYzine HCl 50 MG tablet  sertraline 50 MG tablet  traZODone 50 MG tablet     Pharmacy Instructions:     medications from [facility]            Follow Up Appointment: [address] [place] [address]  [phone]    Follow up on 10/11/2022  Pt has appointment at 9:30 am

## 2022-10-10 NOTE — BH NOTE
Patient given tobacco quitline number [phone] at this time, refusing to call at this time, states \" I just dont want to quit now\"- patient given information as to the dangers of long term tobacco use. Continue to reinforce the importance of tobacco cessation.
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
## 2022-10-10 NOTE — DISCHARGE INSTR - DIET

## 2022-10-10 NOTE — DISCHARGE INSTR - PHARMACY
medications from [facility] AND [facility]